# Patient Record
Sex: FEMALE | Race: WHITE | Employment: FULL TIME | URBAN - METROPOLITAN AREA
[De-identification: names, ages, dates, MRNs, and addresses within clinical notes are randomized per-mention and may not be internally consistent; named-entity substitution may affect disease eponyms.]

---

## 2018-11-05 LAB
ANTIBODY SCREEN, EXTERNAL: NEGATIVE
CHLAMYDIA, EXTERNAL: NEGATIVE
HBSAG, EXTERNAL: NEGATIVE
HIV, EXTERNAL: NORMAL
N. GONORRHEA, EXTERNAL: NEGATIVE
RUBELLA, EXTERNAL: NORMAL
T. PALLIDUM, EXTERNAL: NEGATIVE
TYPE, ABO & RH, EXTERNAL: NORMAL

## 2019-05-22 LAB — GRBS, EXTERNAL: NEGATIVE

## 2019-06-03 ENCOUNTER — HOSPITAL ENCOUNTER (EMERGENCY)
Age: 34
Discharge: HOME | End: 2019-06-03
Attending: OBSTETRICS & GYNECOLOGY | Admitting: OBSTETRICS & GYNECOLOGY
Payer: COMMERCIAL

## 2019-06-03 VITALS
HEART RATE: 115 BPM | DIASTOLIC BLOOD PRESSURE: 81 MMHG | WEIGHT: 199 LBS | OXYGEN SATURATION: 96 % | HEIGHT: 67 IN | SYSTOLIC BLOOD PRESSURE: 118 MMHG | BODY MASS INDEX: 31.23 KG/M2 | TEMPERATURE: 97.5 F | RESPIRATION RATE: 16 BRPM

## 2019-06-03 LAB
ALBUMIN SERPL-MCNC: 2.6 G/DL (ref 3.5–5)
ALBUMIN/GLOB SERPL: 0.8 {RATIO} (ref 1.1–2.2)
ALP SERPL-CCNC: 275 U/L (ref 45–117)
ALT SERPL-CCNC: 17 U/L (ref 12–78)
ANION GAP SERPL CALC-SCNC: 11 MMOL/L (ref 5–15)
AST SERPL-CCNC: 14 U/L (ref 15–37)
BILIRUB SERPL-MCNC: 0.4 MG/DL (ref 0.2–1)
BUN SERPL-MCNC: 9 MG/DL (ref 6–20)
BUN/CREAT SERPL: 14 (ref 12–20)
CALCIUM SERPL-MCNC: 8.5 MG/DL (ref 8.5–10.1)
CHLORIDE SERPL-SCNC: 108 MMOL/L (ref 97–108)
CO2 SERPL-SCNC: 20 MMOL/L (ref 21–32)
CREAT SERPL-MCNC: 0.65 MG/DL (ref 0.55–1.02)
CREAT UR-MCNC: 215 MG/DL
ERYTHROCYTE [DISTWIDTH] IN BLOOD BY AUTOMATED COUNT: 18.6 % (ref 11.5–14.5)
GLOBULIN SER CALC-MCNC: 3.2 G/DL (ref 2–4)
GLUCOSE SERPL-MCNC: 105 MG/DL (ref 65–100)
HCT VFR BLD AUTO: 39.5 % (ref 35–47)
HGB BLD-MCNC: 12.1 G/DL (ref 11.5–16)
MCH RBC QN AUTO: 25.4 PG (ref 26–34)
MCHC RBC AUTO-ENTMCNC: 30.6 G/DL (ref 30–36.5)
MCV RBC AUTO: 83 FL (ref 80–99)
NRBC # BLD: 0 K/UL (ref 0–0.01)
NRBC BLD-RTO: 0 PER 100 WBC
PLATELET # BLD AUTO: 145 K/UL (ref 150–400)
PMV BLD AUTO: 11.8 FL (ref 8.9–12.9)
POTASSIUM SERPL-SCNC: 4.1 MMOL/L (ref 3.5–5.1)
PROT SERPL-MCNC: 5.8 G/DL (ref 6.4–8.2)
PROT UR-MCNC: 37 MG/DL (ref 0–11.9)
PROT/CREAT UR-RTO: 0.2
RBC # BLD AUTO: 4.76 M/UL (ref 3.8–5.2)
SODIUM SERPL-SCNC: 139 MMOL/L (ref 136–145)
WBC # BLD AUTO: 9.1 K/UL (ref 3.6–11)

## 2019-06-03 PROCEDURE — 80053 COMPREHEN METABOLIC PANEL: CPT

## 2019-06-03 PROCEDURE — 36415 COLL VENOUS BLD VENIPUNCTURE: CPT

## 2019-06-03 PROCEDURE — 85027 COMPLETE CBC AUTOMATED: CPT

## 2019-06-03 PROCEDURE — 99285 EMERGENCY DEPT VISIT HI MDM: CPT

## 2019-06-03 PROCEDURE — 84156 ASSAY OF PROTEIN URINE: CPT

## 2019-06-03 RX ORDER — DULOXETIN HYDROCHLORIDE 20 MG/1
40 CAPSULE, DELAYED RELEASE ORAL
COMMUNITY

## 2019-06-03 RX ORDER — DESVENLAFAXINE SUCCINATE 50 MG/1
50 TABLET, EXTENDED RELEASE ORAL
COMMUNITY

## 2019-06-03 RX ORDER — LANOLIN ALCOHOL/MO/W.PET/CERES
CREAM (GRAM) TOPICAL DAILY
COMMUNITY
End: 2019-06-16

## 2019-06-03 NOTE — H&P
Labor and Delivery Triage Note    CC: Sent in for serial BPs and labs due to protein in urine    6/3/2019    29 y.o., , female, G4 P 3 with Estimated Date of Delivery: 19 by dates and US presents at 1510 who presents to L&D as directed by her physician due to elevated UPC of 0.42. BPs have been normal but she has been having some blurred vision and swelling. Reports good fetal movement, no bleeding, no LOF and has no regular contractions. She has no HAs, no RUQ pain except as related to fetal position, no CP/SOB. Her swelling is improved today. Her pregnancy is also notable for anxiety. This pregnancy has been supervised by Dr. Nohemy Martinez. PNC: Blood type: A            RH: pos            Ab screen: negative            HBsAg: negative            DM Screen: 153; 3hr 84, 177, 111, 110            TPA: negative            HIV: non reactive            GC/Chlamydia: negative            Rubella: immune            SVII serology: no history             GBS status: negative    Past Medical History:   Diagnosis Date    Abnormal Papanicolaou smear of cervix     follow up normal    Anemia     Breast disorder     augmentation     Psychiatric problem     anxiety     Past Surgical History:   Procedure Laterality Date    BREAST SURGERY PROCEDURE UNLISTED      HX OTHER SURGICAL      wisdom teeth     OB/GYN: G1 10/06  of VMI 7#5oz       G2   of VMI 7#             G3   of VFI 6#5oz       G4 current  Meds:   No current facility-administered medications for this encounter. Allergies:  Allergies no known allergies  Pertinent ROS: as per HPI o/w negative   Family History   Problem Relation Age of Onset    Thyroid Disease Mother     Parkinson's Disease Father     Alzheimer Maternal Grandfather      Social History     Socioeconomic History    Marital status:      Spouse name: Not on file    Number of children: Not on file    Years of education: Not on file    Highest education level: Not on file   Occupational History    Not on file   Social Needs    Financial resource strain: Not on file    Food insecurity:     Worry: Not on file     Inability: Not on file    Transportation needs:     Medical: Not on file     Non-medical: Not on file   Tobacco Use    Smoking status: Never Smoker    Smokeless tobacco: Never Used   Substance and Sexual Activity    Alcohol use: Not Currently    Drug use: Never    Sexual activity: Yes   Lifestyle    Physical activity:     Days per week: Not on file     Minutes per session: Not on file    Stress: Not on file   Relationships    Social connections:     Talks on phone: Not on file     Gets together: Not on file     Attends Sikhism service: Not on file     Active member of club or organization: Not on file     Attends meetings of clubs or organizations: Not on file     Relationship status: Not on file    Intimate partner violence:     Fear of current or ex partner: Not on file     Emotionally abused: Not on file     Physically abused: Not on file     Forced sexual activity: Not on file   Other Topics Concern     Service Not Asked    Blood Transfusions Not Asked    Caffeine Concern Not Asked    Occupational Exposure Not Asked    Hobby Hazards Not Asked    Sleep Concern Not Asked    Stress Concern Not Asked    Weight Concern Not Asked    Special Diet Not Asked    Back Care Not Asked    Exercise Not Asked    Bike Helmet Not Asked    [address] Not Asked    Self-Exams Not Asked   Social History Narrative    Not on file       OBJECTIVE:  Gravid female NAD  Temp (24hrs), Av.5 °F (36.4 °C), Min:97.5 °F (36.4 °C), Max:97.5 °F (36.4 °C)    Visit Vitals  /81   Pulse (!) 115   Temp 97.5 °F (36.4 °C)   Resp 16   Ht 5' 7\" (1.702 m)   Wt 90.3 kg (199 lb)   SpO2 96%   Breastfeeding?  No   BMI 31.17 kg/m²       Labs:    Lab Results   Component Value Date/Time    WBC 9.1 2019 03:27 PM       Exam:  HEENT:  normal   Lungs: clear  Cor:  RRR  Abdomen:  Soft, gravid, no RUQ tenderness with palpation  Fetal heart rate tracing:  Baseline 130, moderate variability, accels present, decels absent  Contraction pattern: occ  Cervix:  deferred  Fluid:  intact  Ext: symmetric, nontender, no edema  Neuro: 2+ patellar and brachial DTRs    Impression:  IUP at 37 5/7 weeks with elevated UPC in the office; normal BPs and labs here on L&D.     Plan: Discharge home           Follow up in office as scheduled     Irene Berrios MD

## 2019-06-03 NOTE — PROGRESS NOTES
6/3/2019  3:10 PM Pt arrived from home for preeclampsia work up. Pt reports blurred vision for the last 2 weeks as well as swelling of the feet by the end of the day. Denies headache at this time. Reports right upper quadrant discomfort that she attributes to fetal feet, denies tenderness on palpation. Labs drawn and sent.      [place] [address] Dr Ruby Powell at bedside, viewed fetal tracing, discussed plan of care    1630 Discussed lab results with Dr. Ruby Powell, pt to be discharged home with follow up this wee    1640 Discharge instructions given, pt verbalizes understanding, pt has scheduled appointment with Dr. Moriah Jang tomorrow

## 2019-06-03 NOTE — DISCHARGE INSTRUCTIONS
HYPERTENSIVE DISORDERS OF PREGNANCY DISCHARGE INSTRUCTIONS    Name: Vito James  YOB: 1985  Primary Diagnosis: Active Problems:    * No active hospital problems. *      Introduction:  You have visited the hospital because your physician thinks your blood pressure is increased or increasing. This condition during pregnancy is called gestational hypertension. If you also have an elevated level of protein in your urine, this condition is called preeclampsia. Some of the characteristics you may have seen or felt include edema (usually swelling of face, feet, ankles or hands), and increased weight gain, headaches, spots (floaters) before your eyes or excessive epigastric pain (much more intense than indigestion). It is because of these symptoms and the effect on you and your baby/babies that you have come to the hospital. These guidelines are for your information at home to help you decide whether to call your physician an possibly the need for hospitalization. General:         Diet/Diet Restrictions:      Anything you like/tolerate, Follow your regularly prescribed diet and Drink 8-10 glasses of water each day. Avoid beverages with caffeine. Physical Activity / Restrictions / Safety:     * Activity at home is based on how you may be feeling or at the direction of your physician. Activity based on symptoms: Lie down on your side if you feel contractions or tightening in your abdomen. Discharge Instructions/ Special Treatment/ Home Care Needs:     Call your provider if:   You notice an increase in swelling or excessive epigastric pain.  You have a constant headache not relieved by Tylenol.  You have dizziness while carrying out activities of daily living.  You see spots (floaters) before your eyes.  You start having regular painful contractions every 5 minutes or less for 1 hour. Time your contractions from the beginning of one to the beginning of the next one.      Your baby is not moving as much as usual-at least 4 fetal movements in 1 hour after drinking and resting on your side for 1 hour.   You have a gush of fluid or blood from your vagina (it is normal to have spotting after vaginal exam or intercourse).  Other:       Labor instructions:    Uterine cramping (menstrual-like cramps, intermittent or constant   Uterine contractions every 10-15 minutes or more frequently   Low abdominal pressure (pelvic pressure)   Dull low backache (intermittent or constant)   Increase or change in vaginal discharge   Feeling that the baby is \"pushing down\"   Abdominal cramping with or without diarrhea  If any of these symptoms are experienced, stop what you are doing, lie down on your side, drink two to three glasses of water and wait one hour. If the symptoms persist or get worse, call your provider. Pain Management:             Discharge Checklist-NURSING TO COMPLETE:     Date and Time of Discharge: Date: 6/3/2019 Time: 4:25 PM    Return of:   Dental Appliance: Dental Appliances: None  Vision: Visual Aid: None  Hearing Aid:    Jewelry: Jewelry: None  Clothing: Clothing: Footwear, Pants, Undergarments, With patient  Other Valuables: Other Valuables: Johnny Green, Cell Phone  Valuables sent to safe: Personal Items Sent to Safe: none    Prescription Given: no  Medication Instruction Sheet(s), including side effects, provided: no    Accompanied By: self    Mode of Transportation:    Discharge Disposition: Home    I have had the opportunity to make my options or choices for discharge. I have received and understand these instructions. These are general instructions for a healthy lifestyle:    No smoking/ No tobacco products/ Avoid exposure to second hand smoke    Surgeon General's Warning:  Quitting smoking now greatly reduces serious risk to your health.     Obesity, smoking, and sedentary lifestyle greatly increases your risk for illness    A healthy diet, regular physical exercise & weight monitoring are important for maintaining a healthy lifestyle    Recognize signs and symptoms of STROKE:    F-face looks uneven    A-arms unable to move or move unevenly    S-speech slurred or non-existent    T-time-call 911 as soon as signs and symptoms begin-DO NOT go       Back to bed or wait to see if you get better-TIME IS BRAIN.

## 2019-06-14 ENCOUNTER — ANESTHESIA (OUTPATIENT)
Dept: LABOR AND DELIVERY | Age: 34
End: 2019-06-14
Payer: COMMERCIAL

## 2019-06-14 ENCOUNTER — HOSPITAL ENCOUNTER (INPATIENT)
Age: 34
LOS: 2 days | Discharge: HOME | End: 2019-06-16
Attending: OBSTETRICS & GYNECOLOGY | Admitting: OBSTETRICS & GYNECOLOGY
Payer: COMMERCIAL

## 2019-06-14 ENCOUNTER — ANESTHESIA EVENT (OUTPATIENT)
Dept: LABOR AND DELIVERY | Age: 34
End: 2019-06-14
Payer: COMMERCIAL

## 2019-06-14 PROBLEM — F41.9 ANXIETY: Status: ACTIVE | Noted: 2019-06-14

## 2019-06-14 PROBLEM — Z37.9 NORMAL LABOR: Status: ACTIVE | Noted: 2019-06-14

## 2019-06-14 LAB
BASOPHILS # BLD: 0 K/UL (ref 0–0.1)
BASOPHILS NFR BLD: 0 % (ref 0–1)
DIFFERENTIAL METHOD BLD: ABNORMAL
EOSINOPHIL # BLD: 0 K/UL (ref 0–0.4)
EOSINOPHIL NFR BLD: 0 % (ref 0–7)
ERYTHROCYTE [DISTWIDTH] IN BLOOD BY AUTOMATED COUNT: 18.2 % (ref 11.5–14.5)
HCT VFR BLD AUTO: 39.3 % (ref 35–47)
HGB BLD-MCNC: 12.4 G/DL (ref 11.5–16)
IMM GRANULOCYTES # BLD AUTO: 0 K/UL (ref 0–0.04)
IMM GRANULOCYTES NFR BLD AUTO: 0 % (ref 0–0.5)
LYMPHOCYTES # BLD: 1.1 K/UL (ref 0.8–3.5)
LYMPHOCYTES NFR BLD: 12 % (ref 12–49)
MCH RBC QN AUTO: 25.8 PG (ref 26–34)
MCHC RBC AUTO-ENTMCNC: 31.6 G/DL (ref 30–36.5)
MCV RBC AUTO: 81.7 FL (ref 80–99)
MONOCYTES # BLD: 0.6 K/UL (ref 0–1)
MONOCYTES NFR BLD: 7 % (ref 5–13)
NEUTS SEG # BLD: 6.9 K/UL (ref 1.8–8)
NEUTS SEG NFR BLD: 81 % (ref 32–75)
NRBC # BLD: 0 K/UL (ref 0–0.01)
NRBC BLD-RTO: 0 PER 100 WBC
PLATELET # BLD AUTO: 149 K/UL (ref 150–400)
PMV BLD AUTO: 12.5 FL (ref 8.9–12.9)
RBC # BLD AUTO: 4.81 M/UL (ref 3.8–5.2)
WBC # BLD AUTO: 8.6 K/UL (ref 3.6–11)

## 2019-06-14 PROCEDURE — 77030014125 HC TY EPDRL BBMI -B: Performed by: ANESTHESIOLOGY

## 2019-06-14 PROCEDURE — 36415 COLL VENOUS BLD VENIPUNCTURE: CPT

## 2019-06-14 PROCEDURE — 74011000250 HC RX REV CODE- 250: Performed by: ANESTHESIOLOGY

## 2019-06-14 PROCEDURE — 75410000003 HC RECOV DEL/VAG/CSECN EA 0.5 HR

## 2019-06-14 PROCEDURE — 65410000002 HC RM PRIVATE OB

## 2019-06-14 PROCEDURE — 76060000078 HC EPIDURAL ANESTHESIA

## 2019-06-14 PROCEDURE — 74011250636 HC RX REV CODE- 250/636: Performed by: OBSTETRICS & GYNECOLOGY

## 2019-06-14 PROCEDURE — 74011250636 HC RX REV CODE- 250/636: Performed by: ANESTHESIOLOGY

## 2019-06-14 PROCEDURE — 3E0R3BZ INTRODUCTION OF ANESTHETIC AGENT INTO SPINAL CANAL, PERCUTANEOUS APPROACH: ICD-10-PCS | Performed by: ANESTHESIOLOGY

## 2019-06-14 PROCEDURE — 74011250636 HC RX REV CODE- 250/636

## 2019-06-14 PROCEDURE — A4300 CATH IMPL VASC ACCESS PORTAL: HCPCS

## 2019-06-14 PROCEDURE — 75410000002 HC LABOR FEE PER 1 HR

## 2019-06-14 PROCEDURE — 85025 COMPLETE CBC W/AUTO DIFF WBC: CPT

## 2019-06-14 PROCEDURE — 74011000250 HC RX REV CODE- 250

## 2019-06-14 PROCEDURE — 75410000000 HC DELIVERY VAGINAL/SINGLE

## 2019-06-14 PROCEDURE — 74011250637 HC RX REV CODE- 250/637: Performed by: OBSTETRICS & GYNECOLOGY

## 2019-06-14 RX ORDER — SODIUM CHLORIDE, SODIUM LACTATE, POTASSIUM CHLORIDE, CALCIUM CHLORIDE 600; 310; 30; 20 MG/100ML; MG/100ML; MG/100ML; MG/100ML
125 INJECTION, SOLUTION INTRAVENOUS CONTINUOUS
Status: DISCONTINUED | OUTPATIENT
Start: 2019-06-14 | End: 2019-06-16 | Stop reason: HOSPADM

## 2019-06-14 RX ORDER — ONDANSETRON 4 MG/1
4 TABLET, ORALLY DISINTEGRATING ORAL
Status: ACTIVE | OUTPATIENT
Start: 2019-06-14 | End: 2019-06-15

## 2019-06-14 RX ORDER — SIMETHICONE 80 MG
80 TABLET,CHEWABLE ORAL
Status: DISCONTINUED | OUTPATIENT
Start: 2019-06-14 | End: 2019-06-16 | Stop reason: HOSPADM

## 2019-06-14 RX ORDER — OXYTOCIN/0.9 % SODIUM CHLORIDE 30/500 ML
0-25 PLASTIC BAG, INJECTION (ML) INTRAVENOUS
Status: DISCONTINUED | OUTPATIENT
Start: 2019-06-14 | End: 2019-06-16 | Stop reason: HOSPADM

## 2019-06-14 RX ORDER — OXYTOCIN/0.9 % SODIUM CHLORIDE 30/500 ML
PLASTIC BAG, INJECTION (ML) INTRAVENOUS
Status: COMPLETED
Start: 2019-06-14 | End: 2019-06-14

## 2019-06-14 RX ORDER — BUPIVACAINE HYDROCHLORIDE 5 MG/ML
INJECTION, SOLUTION EPIDURAL; INTRACAUDAL AS NEEDED
Status: DISCONTINUED | OUTPATIENT
Start: 2019-06-14 | End: 2019-06-14 | Stop reason: HOSPADM

## 2019-06-14 RX ORDER — OXYTOCIN/RINGER'S LACTATE 20/1000 ML
125-500 PLASTIC BAG, INJECTION (ML) INTRAVENOUS ONCE
Status: COMPLETED | OUTPATIENT
Start: 2019-06-14 | End: 2019-06-14

## 2019-06-14 RX ORDER — ACETAMINOPHEN 325 MG/1
650 TABLET ORAL
Status: DISCONTINUED | OUTPATIENT
Start: 2019-06-14 | End: 2019-06-16 | Stop reason: HOSPADM

## 2019-06-14 RX ORDER — NALOXONE HYDROCHLORIDE 0.4 MG/ML
0.4 INJECTION, SOLUTION INTRAMUSCULAR; INTRAVENOUS; SUBCUTANEOUS AS NEEDED
Status: DISCONTINUED | OUTPATIENT
Start: 2019-06-14 | End: 2019-06-14 | Stop reason: HOSPADM

## 2019-06-14 RX ORDER — ONDANSETRON 2 MG/ML
4 INJECTION INTRAMUSCULAR; INTRAVENOUS
Status: DISCONTINUED | OUTPATIENT
Start: 2019-06-14 | End: 2019-06-14 | Stop reason: HOSPADM

## 2019-06-14 RX ORDER — FENTANYL CITRATE 50 UG/ML
100 INJECTION, SOLUTION INTRAMUSCULAR; INTRAVENOUS ONCE
Status: COMPLETED | OUTPATIENT
Start: 2019-06-14 | End: 2019-06-14

## 2019-06-14 RX ORDER — DIPHENHYDRAMINE HCL 25 MG
25 CAPSULE ORAL
Status: DISCONTINUED | OUTPATIENT
Start: 2019-06-14 | End: 2019-06-16 | Stop reason: HOSPADM

## 2019-06-14 RX ORDER — BUPIVACAINE HYDROCHLORIDE 5 MG/ML
30 INJECTION, SOLUTION EPIDURAL; INTRACAUDAL AS NEEDED
Status: DISCONTINUED | OUTPATIENT
Start: 2019-06-14 | End: 2019-06-14 | Stop reason: HOSPADM

## 2019-06-14 RX ORDER — IBUPROFEN 400 MG/1
800 TABLET ORAL EVERY 8 HOURS
Status: DISCONTINUED | OUTPATIENT
Start: 2019-06-14 | End: 2019-06-16 | Stop reason: HOSPADM

## 2019-06-14 RX ORDER — NALOXONE HYDROCHLORIDE 0.4 MG/ML
0.4 INJECTION, SOLUTION INTRAMUSCULAR; INTRAVENOUS; SUBCUTANEOUS AS NEEDED
Status: DISCONTINUED | OUTPATIENT
Start: 2019-06-14 | End: 2019-06-16 | Stop reason: HOSPADM

## 2019-06-14 RX ORDER — MAG HYDROX/ALUMINUM HYD/SIMETH 200-200-20
30 SUSPENSION, ORAL (FINAL DOSE FORM) ORAL
Status: DISCONTINUED | OUTPATIENT
Start: 2019-06-14 | End: 2019-06-14 | Stop reason: HOSPADM

## 2019-06-14 RX ORDER — CALCIUM CARBONATE 200(500)MG
400 TABLET,CHEWABLE ORAL
Status: DISCONTINUED | OUTPATIENT
Start: 2019-06-14 | End: 2019-06-14 | Stop reason: HOSPADM

## 2019-06-14 RX ORDER — DOCUSATE SODIUM 100 MG/1
100 CAPSULE, LIQUID FILLED ORAL
Status: DISCONTINUED | OUTPATIENT
Start: 2019-06-14 | End: 2019-06-16 | Stop reason: HOSPADM

## 2019-06-14 RX ORDER — NALBUPHINE HYDROCHLORIDE 10 MG/ML
10 INJECTION, SOLUTION INTRAMUSCULAR; INTRAVENOUS; SUBCUTANEOUS
Status: DISCONTINUED | OUTPATIENT
Start: 2019-06-14 | End: 2019-06-14 | Stop reason: HOSPADM

## 2019-06-14 RX ORDER — FENTANYL CITRATE 50 UG/ML
INJECTION, SOLUTION INTRAMUSCULAR; INTRAVENOUS AS NEEDED
Status: DISCONTINUED | OUTPATIENT
Start: 2019-06-14 | End: 2019-06-14 | Stop reason: HOSPADM

## 2019-06-14 RX ORDER — SODIUM CHLORIDE 0.9 % (FLUSH) 0.9 %
5-40 SYRINGE (ML) INJECTION AS NEEDED
Status: DISCONTINUED | OUTPATIENT
Start: 2019-06-14 | End: 2019-06-16 | Stop reason: HOSPADM

## 2019-06-14 RX ORDER — SWAB
1 SWAB, NON-MEDICATED MISCELLANEOUS DAILY
Status: DISCONTINUED | OUTPATIENT
Start: 2019-06-15 | End: 2019-06-16 | Stop reason: HOSPADM

## 2019-06-14 RX ORDER — OXYCODONE AND ACETAMINOPHEN 5; 325 MG/1; MG/1
1 TABLET ORAL
Status: DISCONTINUED | OUTPATIENT
Start: 2019-06-14 | End: 2019-06-16 | Stop reason: HOSPADM

## 2019-06-14 RX ORDER — FENTANYL/BUPIVACAINE/NS/PF 2-1250MCG
1-16 PREFILLED PUMP RESERVOIR EPIDURAL CONTINUOUS
Status: DISCONTINUED | OUTPATIENT
Start: 2019-06-14 | End: 2019-06-16 | Stop reason: HOSPADM

## 2019-06-14 RX ORDER — LIDOCAINE HYDROCHLORIDE AND EPINEPHRINE 15; 5 MG/ML; UG/ML
INJECTION, SOLUTION EPIDURAL AS NEEDED
Status: DISCONTINUED | OUTPATIENT
Start: 2019-06-14 | End: 2019-06-14 | Stop reason: HOSPADM

## 2019-06-14 RX ORDER — SODIUM CHLORIDE 0.9 % (FLUSH) 0.9 %
5-40 SYRINGE (ML) INJECTION EVERY 8 HOURS
Status: DISCONTINUED | OUTPATIENT
Start: 2019-06-14 | End: 2019-06-16 | Stop reason: HOSPADM

## 2019-06-14 RX ORDER — HYDROCORTISONE ACETATE PRAMOXINE HCL 2.5; 1 G/100G; G/100G
CREAM TOPICAL AS NEEDED
Status: DISCONTINUED | OUTPATIENT
Start: 2019-06-14 | End: 2019-06-16 | Stop reason: HOSPADM

## 2019-06-14 RX ORDER — DULOXETIN HYDROCHLORIDE 20 MG/1
40 CAPSULE, DELAYED RELEASE ORAL DAILY
Status: DISCONTINUED | OUTPATIENT
Start: 2019-06-15 | End: 2019-06-16 | Stop reason: HOSPADM

## 2019-06-14 RX ADMIN — BUPIVACAINE HYDROCHLORIDE 1 ML: 5 INJECTION, SOLUTION EPIDURAL; INTRACAUDAL at 19:42

## 2019-06-14 RX ADMIN — LIDOCAINE HYDROCHLORIDE AND EPINEPHRINE 2 ML: 15; 5 INJECTION, SOLUTION EPIDURAL at 19:39

## 2019-06-14 RX ADMIN — Medication 10 ML/HR: at 19:41

## 2019-06-14 RX ADMIN — OXYTOCIN-SODIUM CHLORIDE 0.9% IV SOLN 30 UNIT/500ML 999 MILLI-UNITS/MIN: 30-0.9/5 SOLUTION at 21:06

## 2019-06-14 RX ADMIN — Medication 999 MILLI-UNITS/MIN: at 21:06

## 2019-06-14 RX ADMIN — Medication 6660 MILLI-UNITS/HR: at 22:19

## 2019-06-14 RX ADMIN — FENTANYL CITRATE 50 MCG: 50 INJECTION, SOLUTION INTRAMUSCULAR; INTRAVENOUS at 19:44

## 2019-06-14 RX ADMIN — FENTANYL CITRATE 100 MCG: 50 INJECTION, SOLUTION INTRAMUSCULAR; INTRAVENOUS at 19:36

## 2019-06-14 RX ADMIN — LIDOCAINE HYDROCHLORIDE AND EPINEPHRINE 2 ML: 15; 5 INJECTION, SOLUTION EPIDURAL at 19:40

## 2019-06-14 RX ADMIN — FENTANYL CITRATE 50 MCG: 50 INJECTION, SOLUTION INTRAMUSCULAR; INTRAVENOUS at 19:43

## 2019-06-14 RX ADMIN — IBUPROFEN 800 MG: 400 TABLET, FILM COATED ORAL at 22:44

## 2019-06-14 RX ADMIN — BUPIVACAINE HYDROCHLORIDE 2 ML: 5 INJECTION, SOLUTION EPIDURAL; INTRACAUDAL at 19:41

## 2019-06-14 NOTE — ANESTHESIA PROCEDURE NOTES
Epidural Block    Start time: 6/14/2019 7:31 PM  End time: 6/14/2019 7:44 PM  Performed by: Leeroy Son MD  Authorized by: Leeroy Son MD     Pre-Procedure  Indication: labor epidural    Preanesthetic Checklist: risks and benefits discussed and timeout performed    Timeout Time: 19:31        Epidural:   Patient position:  Left lateral decubitus  Prep region:  Lumbar  Prep: Chlorhexidine    Location:  L3-4    Needle and Epidural Catheter:   Needle Type:  Tuohy  Needle Gauge:  17 G  Injection Technique:  Loss of resistance using air  Attempts:  1  Catheter Size:  19 G  Events: no blood with aspiration, no cerebrospinal fluid with aspiration, no paresthesia and negative aspiration test    Test Dose:  Bupivacaine 0.25% and negative    Assessment:   Catheter Secured:  Tegaderm and tape  Insertion:  Uncomplicated  Patient tolerance:  Patient tolerated the procedure well with no immediate complications

## 2019-06-14 NOTE — PROGRESS NOTES
:  Bedside shift change report given to GAVINO Sifuentes RN (oncoming nurse) by MIGUELINA Mitchell RN (offgoing nurse). Report included the following information SBAR, MAR, Accordion and Recent Results. :  Pt feeling urge to push, SVE 10/10/+2    : Dr. Jillian Griggs at bedside. :  of vigorous female infant. :  TRANSFER - OUT REPORT:    Verbal report given to KALIN Catalan(name) on Alanna Murrieta  being transferred to MIU(unit) for routine progression of care       Report consisted of patients Situation, Background, Assessment and   Recommendations(SBAR). Information from the following report(s) SBAR, Intake/Output, MAR, Accordion and Recent Results was reviewed with the receiving nurse. Lines:   Peripheral IV 19 Anterior; Left Forearm (Active)   Site Assessment Clean, dry, & intact 2019  7:58 PM   Phlebitis Assessment 0 2019  7:58 PM   Infiltration Assessment 0 2019  7:58 PM   Dressing Status Clean, dry, & intact 2019  7:58 PM   Dressing Type Tape;Transparent 2019  7:58 PM   Hub Color/Line Status Pink; Infusing 2019  7:58 PM        Opportunity for questions and clarification was provided.       Patient transported with:   Registered Nurse

## 2019-06-14 NOTE — PROGRESS NOTES
1825-pt here from home, episode of bright red blood at home, now feeling UCs, no active bleeding  1839-spoke with Dr Jeovany Solis her on pt arrival and status  1900-Dr Vasquez here to see pt  1907-off monitor to move around, into knee chest  1918-up to void  1937-Epidural placed by Dr Alonso Carver, bedside report to Torres Hoang RN

## 2019-06-14 NOTE — H&P
History & Physical    Name: Melia Grant MRN: [id number]  SSN: [id number]    YOB: 1985  Age: 29 y.o. Sex: female        Subjective:     Estimated Date of Delivery: 19  OB History    Para Term  AB Living   4 3 3     3   SAB TAB Ectopic Molar Multiple Live Births                    # Outcome Date GA Lbr Manuel/2nd Weight Sex Delivery Anes PTL Lv   4 Current            3 Term            2 Term            1 Term                Ms. Vito Arce is a very pleasant  29 y.o. [id number] at 44w2d who presents with c/o vaginal bleeding. It started off as a little bit of spotting and then was more period like and actually ran down her legs. Her ctxs have since intensified. Prenatal course was normal. Please see prenatal records for details. She does have a history of anxiety. She's had 3 term vaginal deliveries. The last one was OP so it took her a bit longer to push out. She's here with her  Rowdy Le.    Past Medical History:   Diagnosis Date    Abnormal Papanicolaou smear of cervix     follow up normal    Anemia     Breast disorder     augmentation 2004    Psychiatric problem     anxiety     Past Surgical History:   Procedure Laterality Date    BREAST SURGERY PROCEDURE UNLISTED      HX OTHER SURGICAL      wisdom teeth     Social History     Occupational History    Not on file   Tobacco Use    Smoking status: Never Smoker    Smokeless tobacco: Never Used   Substance and Sexual Activity    Alcohol use: Not Currently    Drug use: Never    Sexual activity: Yes     Family History   Problem Relation Age of Onset    Thyroid Disease Mother     Parkinson's Disease Father     Alzheimer Maternal Grandfather        No Known Allergies  Prior to Admission medications    Medication Sig Start Date End Date Taking? Authorizing Provider   desvenlafaxine succinate (PRISTIQ) 50 mg ER tablet Take 50 mg by mouth daily.    Yes Provider, Historical   DULoxetine (CYMBALTA) 20 mg capsule Take 40 mg by mouth daily. Yes Provider, Historical   [id number] deven-folic-dha (PRENATAL DHA+COMPLETE PRENATAL) [id number] mg-mcg-mg cmpk Take  by mouth. Yes Provider, Historical   ferrous sulfate (IRON) 325 mg (65 mg iron) tablet Take  by mouth daily. Yes Provider, Historical        Review of Systems: A comprehensive review of systems was negative except for that written in the HPI. Objective:     Vitals:  Vitals:    06/14/19 1830   BP: 127/87   Pulse: 95   Resp: 16   Temp: 99 °F (37.2 °C)        Physical Exam:  Fundus: soft and non tender  Cervical Exam: 3/80/-2  Presenting Part: cephalic  Cervical Position: anterior  (Cervical exam per nurse)  Membranes:  Intact  Fetal Heart Rate: Reactive  Baseline: 130 per minute  Variability: moderate  Accelerations: yes  Decelerations: none  Category I    Prenatal Labs:   Lab Results   Component Value Date/Time    Rubella, External Immune 11/05/2018    GrBStrep, External Negative 05/22/2019    HBsAg, External Negative 11/05/2018    Gonorrhea, External Negative 11/05/2018    Chlamydia, External Negative 11/05/2018    ABO,Rh A Positive 11/05/2018         Assessment/Plan:     Active Problems:    Normal labor (6/14/2019)      Anxiety (6/14/2019)         Plan: Admit for Labor  Continue expectant management, Continue plan for vaginal delivery, I reviewed her written birth wishes with her. She plans epidural for pain management. .  Group B Strep was negative.

## 2019-06-15 PROCEDURE — 65410000002 HC RM PRIVATE OB

## 2019-06-15 PROCEDURE — 74011250637 HC RX REV CODE- 250/637: Performed by: OBSTETRICS & GYNECOLOGY

## 2019-06-15 RX ORDER — IBUPROFEN 600 MG/1
600 TABLET ORAL
Qty: 40 TAB | Refills: 0 | Status: SHIPPED | OUTPATIENT
Start: 2019-06-15 | End: 2020-07-09

## 2019-06-15 RX ADMIN — IBUPROFEN 800 MG: 400 TABLET, FILM COATED ORAL at 06:06

## 2019-06-15 RX ADMIN — IBUPROFEN 800 MG: 400 TABLET, FILM COATED ORAL at 16:11

## 2019-06-15 NOTE — ROUTINE PROCESS
Bedside and Verbal shift change report given to 3030 W Dr Siria Dyson (oncoming nurse) by Naseem Calderon RN (offgoing nurse). Report included the following information SBAR.     0030: Mom stated she felt a large gush. Upon assessment ice pack filled with Lochia. Massaged fundus which was firm @u, quarter size clot expelled. Pt up to the bathroom. Massaged fundus after emptying bladder, firm midline @u  No additional bleeding noted.      0300: Educated mom about not falling asleep with baby in the bed

## 2019-06-15 NOTE — LACTATION NOTE
This note was copied from a baby's chart. Initial Lactation Consultation:  Mom is a 32yo  delivered  yesterday at 1851 gestation 44 2/7weeks. Medical history is significant breast augmentation and for anxiety (on Pristiq and Cymbalta); lactation history: breast fed first for 6 weeks, second for less than a week, and third only in the hospital. She had milk with each, but stopped due to nipple pain. Mom's goal is to exclusively breast feed this infant 6 months. She did have breast changes with pregnancy. Mom taught breast massage and hand expression with large drops of colostrum easily expressed. Infant has been alert and nursing since delivery, but arching back. Assisted mom to position infant sidelying. Baby nursing well, deep latch obtained, mother is comfortable, baby feeding vigorously with rhythmic suck, swallow, breathe pattern, intermittent audible swallowing, and evident milk transfer, both breasts offered, baby is asleep following feeding. Mom taught characteristics of deep v shallow latch. Otis behaviors reviewed, Very sleepy in first 24 hours, mother must wake baby for feedings, offer hand expressed drops, baby usually will respond and feed vigorously 6-8 times in the first day, but is important to offer 8-12 times,  After baby wakes from deep sleep usually on the 2nd or 3rd day a new behavior pattern follows. Frequent feeding during this brief behavioral phase preceeding milk transition is called cluster feeding. Typical  behavior: baby becomes vigorous at the breast and wants to feed frequently- every 1-2 hours for several feedings. This is the normal process by which the baby demands his/her supply. This type of frequent feeding is the stimulation which causes lactogenesis II (milk coming in). Feeding Plan:   Mother will keep baby skin to skin as often as possible, feed on demand, 8-12x/day , respond to feeding cues, obtain latch, listen for audible swallowing, be aware of signs of oxytocin release/ cramping,thirst,sleepiness while breastfeeding, offer both breasts,and will not limit feedings. Mother agrees to utilize breast massage while nursing to facilitate lactogenesis.

## 2019-06-15 NOTE — ANESTHESIA POSTPROCEDURE EVALUATION
Post-Anesthesia Evaluation and Assessment    Patient: Hannah Montenegro MRN: [id number]  SSN: [id number]    YOB: 1985  Age: 29 y.o. Sex: female      I have evaluated the patient and they are stable and ready for discharge from the PACU. Cardiovascular Function/Vital Signs  Visit Vitals  /56   Pulse (!) 108   Temp 37.2 °C (99 °F)   Resp 16   SpO2 98%       Patient is status post * No anesthesia type entered * anesthesia for * No procedures listed *. Nausea/Vomiting: None    Postoperative hydration reviewed and adequate. Pain:  Pain Scale 1: Labor Algorithm/Pain Intensity (06/14/19 1462)   Managed    Neurological Status: At baseline    Mental Status, Level of Consciousness: Alert and  oriented to person, place, and time    Pulmonary Status:       Adequate oxygenation and airway patent    Complications related to anesthesia: None    Post-anesthesia assessment completed. No concerns    Signed By: Rod Baeza MD     June 14, 2019              * No procedures listed *.    epidural    <BSHSIANPOST>    Vitals Value Taken Time   /66 6/14/2019  9:01 PM   Temp     Pulse 98 6/14/2019  9:01 PM   Resp     SpO2 96 % 6/14/2019  8:51 PM   Vitals shown include unvalidated device data.

## 2019-06-15 NOTE — L&D DELIVERY NOTE
Delivery Note    Obstetrician:  Ryan Lindsay MD      Procedure: Spontaneous vaginal delivery    Stephanie Diop quickly progressed to complete. She pushed for about 20 minutes or so. Baby girl (name TBD) delivered in [address] [place] [address]. Shoulders and body easily followed. Baby girl placed on mom's chest. Cord clamped and cut by grandma ( declined) after a couple minutes. Placenta delivered spontaneously and intact. No lacerations. Epidural: YES    Estimated Blood Loss: See QBL    Episiotomy: none    Laceration(s):  none    Birth Weight:pending    Birth Length: pending    Apgar - One Minute: 8    Apgar - Five Minutes: 9    Umbilical Cord: 3 vessels present  Specimens: none  Complications:  none           Cord Blood Results:   Information for the patient's :  Nenita Robb [[id number]]   No results found for: PCTABR, PCTDIG, BILI, ABORH    Prenatal Labs:     Lab Results   Component Value Date/Time    HBsAg, External Negative 2018    Rubella, External Immune 2018    Gonorrhea, External Negative 2018    Chlamydia, External Negative 2018    GrBStrep, External Negative 2019           Delivery Summary    Patient: Ariadne Medellin MRN: [id number]  SSN: [id number]    YOB: 1985  Age: 29 y.o. Sex: female       Information for the patient's :  Nenita Robb [[id number]]       Labor Events:    Labor: No    Steroids: None   Cervical Ripening Date/Time:       Cervical Ripening Type: None   Antibiotics During Labor: No   Rupture Identifier:      Rupture Date/Time: 2019 8:18 PM   Rupture Type: AROM   Amniotic Fluid Volume:  Moderate    Amniotic Fluid Description: Meconium    Amniotic Fluid Odor: None    Induction: None       Induction Date/Time:        Indications for Induction:      Augmentation:     Augmentation Date/Time:      Indications for Augmentation:     Labor complications: None       Additional complications:        Delivery Events:  Indications For Episiotomy:     Episiotomy: None   Perineal Laceration(s): None   Repaired:     Periurethral Laceration Location:      Repaired:     Labial Laceration Location:     Repaired:     Sulcal Laceration Location:     Repaired:     Vaginal Laceration Location:     Repaired:     Cervical Laceration Location:     Repaired:     Repair Suture: None   Number of Repair Packets:     Estimated Blood Loss (ml):  ml     Delivery Date: 2019    Delivery Time: 8:51 PM  Delivery Type: Vaginal, Spontaneous  Sex:  Female    Gestational Age: 44w2d   Delivery Clinician:  Nathanael Boas Rankins  Living Status: Living   Delivery Location: L&D            APGARS  One minute Five minutes Ten minutes   Skin color: 1   1        Heart rate: 2   2        Grimace: 2   2        Muscle tone: 2   2        Breathin   2        Totals: 8   9            Presentation: Vertex    Position:        Resuscitation Method:  Tactile Stimulation;Suctioning-bulb     Meconium Stained: Thin      Cord Information: 3 Vessels  Complications: None  Cord around:    Delayed cord clamping? Yes  Cord clamped date/time:   Disposition of Cord Blood: Discard    Blood Gases Sent?: No    Placenta:  Date/Time: 2019  8:55 PM  Removal: Spontaneous      Appearance: Normal;Intact     [place] Measurements:  Birth Weight:        Birth Length:        Head Circumference:        Chest Circumference:       Abdominal Girth: Other Providers:   Javy GONCALVES;SAVAGE TRINH;Etienne TREJO, Obstetrician;Primary Nurse;Primary  Nurse;;;;;;;           Group B Strep:   Lab Results   Component Value Date/Time    GrBStrep, External Negative 2019     Information for the patient's :  Catracho Petersenliborio [[id number]]   No results found for: ABORH, PCTABR, PCTDIG, BILI, ABORHEXT, ABORH    No results for input(s): PCO2CB, PO2CB, HCO3I, SO2I, IBD, PTEMPI, SPECTI, PHICB, ISITE, IDEV, IALLEN in the last 72 hours.

## 2019-06-15 NOTE — DISCHARGE INSTRUCTIONS
Patient Education        Vaginal Childbirth: Care Instructions  Your Care Instructions    Your body will slowly heal in the next few weeks. It is easy to get too tired and overwhelmed during the first weeks after your baby is born. Changes in your hormones can shift your mood without warning. You may find it hard to meet the extra demands on your energy and time. Take it easy on yourself. Follow-up care is a key part of your treatment and safety. Be sure to make and go to all appointments, and call your doctor if you are having problems. It's also a good idea to know your test results and keep a list of the medicines you take. How can you care for yourself at home? · Vaginal bleeding and cramps  ? After delivery, you will have a bloody discharge from the vagina. This will turn pink within a week and then white or yellow after about 10 days. It may last for 2 to 4 weeks or longer, until the uterus has healed. Use pads instead of tampons until you stop bleeding. ? Do not worry if you pass some blood clots, as long as they are smaller than a golf ball. If you have a tear or stitches in your vaginal area, change the pad at least every 4 hours to prevent soreness and infection. ? You may have cramps for the first few days after childbirth. These are normal and occur as the uterus shrinks to normal size. Take an over-the-counter pain medicine, such as acetaminophen (Tylenol), ibuprofen (Advil, Motrin), or naproxen (Aleve), for cramps. Read and follow all instructions on the label. Do not take aspirin, because it can cause more bleeding. ? Do not take two or more pain medicines at the same time unless the doctor told you to. Many pain medicines have acetaminophen, which is Tylenol. Too much acetaminophen (Tylenol) can be harmful. · Stitches  ? If you have stitches, they will dissolve on their own and do not need to be removed. Follow your doctor's instructions for cleaning the stitched area.   ? Put ice or a cold pack on your painful area for 10 to 20 minutes at a time, several times a day, for the first few days. Put a thin cloth between the ice and your skin. ? Sit in a few inches of warm water (sitz bath) 3 times a day and after bowel movements. The warm water helps with pain and itching. If you do not have a tub, a warm shower might help. · Breast fullness  ? Your breasts may overfill (engorge) in the first few days after delivery. To help milk flow and to relieve pain, warm your breasts in the shower or by using warm, moist towels before nursing. ? If you are not nursing, do not put warmth on your breasts or touch your breasts. Wear a tight bra or sports bra and use ice until the fullness goes away. This usually takes 2 to 3 days. ? Put ice or a cold pack on your breast after nursing to reduce swelling and pain. Put a thin cloth between the ice and your skin. · Activity  ? Eat a balanced diet. Do not try to lose weight by cutting calories. Keep taking your prenatal vitamins, or take a multivitamin. ? Get as much rest as you can. Try to take naps when your baby sleeps during the day. ? Get some exercise every day. But do not do any heavy exercise until your doctor says it is okay. ? Wait until you are healed (about 4 to 6 weeks) before you have sexual intercourse. Your doctor will tell you when it is okay to have sex. ? Talk to your doctor about birth control. You can get pregnant even before your period returns. Also, you can get pregnant while you are breastfeeding. · Mental health  ? It is normal to have some sadness, anxiety, sleeplessness, and mood swings after you go home. If you feel upset or hopeless for more than a few days or are having trouble doing the things you need to do, talk to your doctor. · Constipation and hemorrhoids  ? Drink plenty of fluids, enough so that your urine is light yellow or clear like water.  If you have kidney, heart, or liver disease and have to limit fluids, talk with your doctor before you increase the amount of fluids you drink. ? Eat plenty of fiber each day. Have a bran muffin or bran cereal for breakfast, and try eating a piece of fruit for a mid-afternoon snack. ? For painful, itchy hemorrhoids, put ice or a cold pack on the area several times a day for 10 minutes at a time. Follow this by putting a warm compress on the area for another 10 to 20 minutes or by sitting in a shallow, warm bath. When should you call for help? Call 911 anytime you think you may need emergency care. For example, call if:    · You passed out (lost consciousness).    Call your doctor now or seek immediate medical care if:    · You have severe vaginal bleeding.     · You are dizzy or lightheaded, or you feel like you may faint.     · You have a fever.     · You have new or more pain in your belly or pelvis.    Watch closely for changes in your health, and be sure to contact your doctor if:    · Your vaginal bleeding seems to be getting heavier.     · You have new or worse vaginal discharge.     · You feel sad, anxious, or hopeless for more than a few days.     · You do not get better as expected. Where can you learn more? Go to http://rich-laura.info/. Enter M068 in the search box to learn more about \"Vaginal Childbirth: Care Instructions. \"  Current as of: September 5, 2018  Content Version: 11.9  © 3831-2273 [employer]. Care instructions adapted under license by [employer] (which disclaims liability or warranty for this information). If you have questions about a medical condition or this instruction, always ask your healthcare professional. [place] [address] any warranty or liability for your use of this information.        Breastfeeding Support Group  [employer] [facility] meets the 1st and 3rd Tuesday of each month in the [address] [place] [address] [employer] Department from 10:00-11:00, (located behind [employer] on the first floor) Meetings are facilitated by board certified lactation consultants and all breastfeeding moms and their infants are invited.

## 2019-06-15 NOTE — ROUTINE PROCESS
[id number] TRANSFER - IN REPORT:    Verbal report received from Carl West RN(name) on Kellie Tang  being received from L&D(unit) for routine progression of care      Report consisted of patients Situation, Background, Assessment and   Recommendations(SBAR). Information from the following report(s) SBAR was reviewed with the receiving nurse. Opportunity for questions and clarification was provided. Assessment completed upon patients arrival to unit and care assumed.

## 2019-06-15 NOTE — DISCHARGE SUMMARY
Obstetrical Discharge Summary     Name: Gracie Moreno MRN: [id number]  SSN: [id number]    YOB: 1985  Age: 29 y.o. Sex: female      Admit Date: 2019    Discharge Date: 2019     Admitting Physician: Avril Tony MD     Attending Physician:  Lubna Whiting, Diana Allen,*     Admission Diagnoses: Normal labor [O80, Z37.9]  Normal labor [O80, Z37.9]    Discharge Diagnoses:   Information for the patient's :  Reva Valencia [[id number]]   Delivery of a 3.41 kg female infant via Vaginal, Spontaneous on 2019 at 8:51 PM  by Librado Henry. Apgars were 8  and 9 . Additional Diagnoses:   Hospital Problems  Date Reviewed: 2019          Codes Class Noted POA    Normal labor ICD-10-CM: O80, Z37.9  ICD-9-CM: 236  2019 Yes        Anxiety ICD-10-CM: F41.9  ICD-9-CM: 300.00  2019 Yes             Lab Results   Component Value Date/Time    Rubella, External Immune 2018    GrBStrep, External Negative 2019       Hospital Course: Normal hospital course following the delivery. Disposition at Discharge: Home or self care    Discharged Condition: Stable    Patient Instructions:   Current Discharge Medication List      START taking these medications    Details   ibuprofen (MOTRIN) 600 mg tablet Take 1 Tab by mouth every six (6) hours as needed for Pain. Qty: 40 Tab, Refills: 0         CONTINUE these medications which have NOT CHANGED    Details   desvenlafaxine succinate (PRISTIQ) 50 mg ER tablet Take 50 mg by mouth daily. DULoxetine (CYMBALTA) 20 mg capsule Take 40 mg by mouth daily. [id number] deven-folic-dha (PRENATAL DHA+COMPLETE PRENATAL) -300 mg-mcg-mg cmpk Take  by mouth. STOP taking these medications       ferrous sulfate (IRON) 325 mg (65 mg iron) tablet Comments:   Reason for Stopping:               Reference my discharge instructions.     Follow-up Appointments   Procedures    FOLLOW UP VISIT Appointment in: 6 Weeks Standing Status:   Standing     Number of Occurrences:   1     Order Specific Question:   Appointment in     Answer:   6 Weeks        Signed By:  Bob Scott MD     Uma 15, 2019

## 2019-06-15 NOTE — PROGRESS NOTES
Post-Partum Day Number 1 Progress Note    Meera Segallius     Assessment: Doing well, post partum day 1 s/p TSVD    Plan:  1. Continue routine postpartum and perineal care as well as maternal education. 2. Patient desires early discharge today - recommend she discuss with the pediatrician as her baby may not be eligible for discharge, but patient is stable and a candidate     Information for the patient's :  Chancey Gottron [[id number]]   Vaginal, Spontaneous     Subjective:  Patient doing well without significant complaint. Voiding without difficulty, normal lochia. Cramping only with breastfeeding. Wants to go home today. Vitals:  Visit Vitals  /76 (BP 1 Location: Right arm, BP Patient Position: At rest)   Pulse 83   Temp 97.8 °F (36.6 °C)   Resp 14   SpO2 96%   Breastfeeding? Unknown     Temp (24hrs), Av.4 °F (36.9 °C), Min:97.8 °F (36.6 °C), Max:99 °F (37.2 °C)        Exam:   Patient without distress. Abdomen soft, fundus firm, nontender                Perineum with normal lochia noted. Lower extremities are negative for swelling, cords or tenderness.     Labs:     Lab Results   Component Value Date/Time    WBC 8.6 2019 07:13 PM    WBC 9.1 2019 03:27 PM    HGB 12.4 2019 07:13 PM    HGB 12.1 2019 03:27 PM    HCT 39.3 2019 07:13 PM    HCT 39.5 2019 03:27 PM    PLATELET 036 (L)  07:13 PM    PLATELET 822 (L)  03:27 PM       Recent Results (from the past 24 hour(s))   CBC WITH AUTOMATED DIFF    Collection Time: 19  7:13 PM   Result Value Ref Range    WBC 8.6 3.6 - 11.0 K/uL    RBC 4.81 3.80 - 5.20 M/uL    HGB 12.4 11.5 - 16.0 g/dL    HCT 39.3 35.0 - 47.0 %    MCV 81.7 80.0 - 99.0 FL    MCH 25.8 (L) 26.0 - 34.0 PG    MCHC 31.6 30.0 - 36.5 g/dL    RDW 18.2 (H) 11.5 - 14.5 %    PLATELET 473 (L) 784 - 400 K/uL    MPV 12.5 8.9 - 12.9 FL    NRBC 0.0 0  WBC    ABSOLUTE NRBC 0.00 0.00 - 0.01 K/uL NEUTROPHILS 81 (H) 32 - 75 %    LYMPHOCYTES 12 12 - 49 %    MONOCYTES 7 5 - 13 %    EOSINOPHILS 0 0 - 7 %    BASOPHILS 0 0 - 1 %    IMMATURE GRANULOCYTES 0 0.0 - 0.5 %    ABS. NEUTROPHILS 6.9 1.8 - 8.0 K/UL    ABS. LYMPHOCYTES 1.1 0.8 - 3.5 K/UL    ABS. MONOCYTES 0.6 0.0 - 1.0 K/UL    ABS. EOSINOPHILS 0.0 0.0 - 0.4 K/UL    ABS. BASOPHILS 0.0 0.0 - 0.1 K/UL    ABS. IMM.  GRANS. 0.0 0.00 - 0.04 K/UL    DF AUTOMATED

## 2019-06-16 VITALS
DIASTOLIC BLOOD PRESSURE: 80 MMHG | RESPIRATION RATE: 16 BRPM | TEMPERATURE: 98 F | OXYGEN SATURATION: 96 % | SYSTOLIC BLOOD PRESSURE: 127 MMHG | HEART RATE: 100 BPM

## 2019-06-16 PROCEDURE — 74011250637 HC RX REV CODE- 250/637: Performed by: OBSTETRICS & GYNECOLOGY

## 2019-06-16 RX ADMIN — IBUPROFEN 800 MG: 400 TABLET, FILM COATED ORAL at 00:55

## 2019-06-16 NOTE — PROGRESS NOTES
Post-Partum Day Number 2 Progress Note    Meera Max     Assessment: Doing well, post partum day 2    Plan:   1. Discharge home today  2. Follow up in office in 6 weeks with [employer], [employer],*  3. Post partum activity advised, diet as tolerated  4. Discharge Medications: ibuprofen and PNV    Information for the patient's :  Rafa Devries [[id number]]   Vaginal, Spontaneous    Subjective:  Patient doing well without significant complaint. Voiding without difficulty, normal lochia. Vitals:  Visit Vitals  /84 (BP 1 Location: Left arm, BP Patient Position: At rest)   Pulse 85   Temp 97.8 °F (36.6 °C)   Resp 14   SpO2 96%   Breastfeeding? Unknown     Temp (24hrs), Av.1 °F (36.7 °C), Min:97.7 °F (36.5 °C), Max:98.8 °F (37.1 °C)      Exam:         Patient without distress. Abdomen soft, fundus firm, nontender                 Lower extremities are negative for swelling, cords or tenderness. Labs:     Lab Results   Component Value Date/Time    WBC 8.6 2019 07:13 PM    WBC 9.1 2019 03:27 PM    HGB 12.4 2019 07:13 PM    HGB 12.1 2019 03:27 PM    HCT 39.3 2019 07:13 PM    HCT 39.5 2019 03:27 PM    PLATELET 203 (L)  07:13 PM    PLATELET 917 (L)  03:27 PM       No results found for this or any previous visit (from the past 24 hour(s)).

## 2019-06-16 NOTE — ROUTINE PROCESS
Bedside and Verbal shift change report given to [address] Dyson (oncoming nurse) by Everette Mauricio RN (offgoing nurse). Report included the following information SBAR.

## 2020-07-09 ENCOUNTER — APPOINTMENT (OUTPATIENT)
Dept: GENERAL RADIOLOGY | Age: 35
End: 2020-07-09
Attending: NEUROLOGICAL SURGERY
Payer: COMMERCIAL

## 2020-07-09 ENCOUNTER — APPOINTMENT (OUTPATIENT)
Dept: CT IMAGING | Age: 35
End: 2020-07-09
Attending: EMERGENCY MEDICINE
Payer: COMMERCIAL

## 2020-07-09 ENCOUNTER — APPOINTMENT (OUTPATIENT)
Dept: MRI IMAGING | Age: 35
End: 2020-07-09
Attending: NURSE PRACTITIONER
Payer: COMMERCIAL

## 2020-07-09 ENCOUNTER — ANESTHESIA EVENT (OUTPATIENT)
Dept: SURGERY | Age: 35
End: 2020-07-09
Payer: COMMERCIAL

## 2020-07-09 ENCOUNTER — ANESTHESIA (OUTPATIENT)
Dept: SURGERY | Age: 35
End: 2020-07-09
Payer: COMMERCIAL

## 2020-07-09 ENCOUNTER — HOSPITAL ENCOUNTER (OUTPATIENT)
Age: 35
Setting detail: OBSERVATION
Discharge: HOME | End: 2020-07-10
Attending: EMERGENCY MEDICINE | Admitting: NEUROLOGICAL SURGERY
Payer: COMMERCIAL

## 2020-07-09 DIAGNOSIS — Z87.39 S/P DISCECTOMY FOR HERNIATED NUCLEUS PULPOSUS: Primary | ICD-10-CM

## 2020-07-09 DIAGNOSIS — Z98.890 S/P DISCECTOMY FOR HERNIATED NUCLEUS PULPOSUS: Primary | ICD-10-CM

## 2020-07-09 PROBLEM — M51.26 LUMBAR HERNIATED DISC: Status: ACTIVE | Noted: 2020-07-09

## 2020-07-09 LAB
ANION GAP SERPL CALC-SCNC: 6 MMOL/L (ref 5–15)
APPEARANCE UR: CLEAR
BACTERIA URNS QL MICRO: NEGATIVE /HPF
BASOPHILS # BLD: 0 K/UL (ref 0–0.1)
BASOPHILS NFR BLD: 1 % (ref 0–1)
BILIRUB UR QL: NEGATIVE
BUN SERPL-MCNC: 12 MG/DL (ref 6–20)
BUN/CREAT SERPL: 19 (ref 12–20)
CALCIUM SERPL-MCNC: 8.9 MG/DL (ref 8.5–10.1)
CHLORIDE SERPL-SCNC: 110 MMOL/L (ref 97–108)
CO2 SERPL-SCNC: 23 MMOL/L (ref 21–32)
COLOR UR: ABNORMAL
COMMENT, HOLDF: NORMAL
COVID-19 RAPID TEST, COVR: NOT DETECTED
CREAT SERPL-MCNC: 0.64 MG/DL (ref 0.55–1.02)
DIFFERENTIAL METHOD BLD: NORMAL
EOSINOPHIL # BLD: 0.2 K/UL (ref 0–0.4)
EOSINOPHIL NFR BLD: 3 % (ref 0–7)
EPITH CASTS URNS QL MICRO: ABNORMAL /LPF
ERYTHROCYTE [DISTWIDTH] IN BLOOD BY AUTOMATED COUNT: 12.8 % (ref 11.5–14.5)
GLUCOSE SERPL-MCNC: 90 MG/DL (ref 65–100)
GLUCOSE UR STRIP.AUTO-MCNC: NEGATIVE MG/DL
HCG UR QL: NEGATIVE
HCT VFR BLD AUTO: 45.6 % (ref 35–47)
HGB BLD-MCNC: 14.8 G/DL (ref 11.5–16)
HGB UR QL STRIP: NEGATIVE
HYALINE CASTS URNS QL MICRO: ABNORMAL /LPF (ref 0–5)
IMM GRANULOCYTES # BLD AUTO: 0 K/UL (ref 0–0.04)
IMM GRANULOCYTES NFR BLD AUTO: 0 % (ref 0–0.5)
INR PPP: 1 (ref 0.9–1.1)
KETONES UR QL STRIP.AUTO: NEGATIVE MG/DL
LEUKOCYTE ESTERASE UR QL STRIP.AUTO: ABNORMAL
LYMPHOCYTES # BLD: 1.3 K/UL (ref 0.8–3.5)
LYMPHOCYTES NFR BLD: 25 % (ref 12–49)
MCH RBC QN AUTO: 30.7 PG (ref 26–34)
MCHC RBC AUTO-ENTMCNC: 32.5 G/DL (ref 30–36.5)
MCV RBC AUTO: 94.6 FL (ref 80–99)
MONOCYTES # BLD: 0.4 K/UL (ref 0–1)
MONOCYTES NFR BLD: 8 % (ref 5–13)
NEUTS SEG # BLD: 3.3 K/UL (ref 1.8–8)
NEUTS SEG NFR BLD: 63 % (ref 32–75)
NITRITE UR QL STRIP.AUTO: NEGATIVE
NRBC # BLD: 0 K/UL (ref 0–0.01)
NRBC BLD-RTO: 0 PER 100 WBC
PH UR STRIP: 5.5 [PH] (ref 5–8)
PLATELET # BLD AUTO: 181 K/UL (ref 150–400)
PMV BLD AUTO: 10.9 FL (ref 8.9–12.9)
POTASSIUM SERPL-SCNC: 4.1 MMOL/L (ref 3.5–5.1)
PROT UR STRIP-MCNC: NEGATIVE MG/DL
PROTHROMBIN TIME: 10.7 SEC (ref 9–11.1)
RBC # BLD AUTO: 4.82 M/UL (ref 3.8–5.2)
RBC #/AREA URNS HPF: ABNORMAL /HPF (ref 0–5)
SAMPLES BEING HELD,HOLD: NORMAL
SARS-COV-2, COV2: NOT DETECTED
SODIUM SERPL-SCNC: 139 MMOL/L (ref 136–145)
SOURCE, COVRS: NORMAL
SP GR UR REFRACTOMETRY: 1.02 (ref 1–1.03)
SPECIMEN SOURCE, FCOV2M: NORMAL
SPECIMEN SOURCE, FCOV2M: NORMAL
UA: UC IF INDICATED,UAUC: ABNORMAL
UROBILINOGEN UR QL STRIP.AUTO: 1 EU/DL (ref 0.2–1)
WBC # BLD AUTO: 5.2 K/UL (ref 3.6–11)
WBC URNS QL MICRO: ABNORMAL /HPF (ref 0–4)

## 2020-07-09 PROCEDURE — 77030018836 HC SOL IRR NACL ICUM -A: Performed by: NEUROLOGICAL SURGERY

## 2020-07-09 PROCEDURE — 74011250636 HC RX REV CODE- 250/636: Performed by: NURSE ANESTHETIST, CERTIFIED REGISTERED

## 2020-07-09 PROCEDURE — 87635 SARS-COV-2 COVID-19 AMP PRB: CPT

## 2020-07-09 PROCEDURE — 77030013079 HC BLNKT BAIR HGGR 3M -A: Performed by: ANESTHESIOLOGY

## 2020-07-09 PROCEDURE — 77030014650 HC SEAL MTRX FLOSEL BAXT -C: Performed by: NEUROLOGICAL SURGERY

## 2020-07-09 PROCEDURE — 76210000017 HC OR PH I REC 1.5 TO 2 HR: Performed by: NEUROLOGICAL SURGERY

## 2020-07-09 PROCEDURE — 77030040356 HC CORD BPLR FRCP COVD -A: Performed by: NEUROLOGICAL SURGERY

## 2020-07-09 PROCEDURE — 77030026438 HC STYL ET INTUB CARD -A: Performed by: ANESTHESIOLOGY

## 2020-07-09 PROCEDURE — 74011250636 HC RX REV CODE- 250/636: Performed by: NURSE PRACTITIONER

## 2020-07-09 PROCEDURE — 74011250636 HC RX REV CODE- 250/636: Performed by: EMERGENCY MEDICINE

## 2020-07-09 PROCEDURE — 85610 PROTHROMBIN TIME: CPT

## 2020-07-09 PROCEDURE — 85025 COMPLETE CBC W/AUTO DIFF WBC: CPT

## 2020-07-09 PROCEDURE — 74011250637 HC RX REV CODE- 250/637: Performed by: ANESTHESIOLOGY

## 2020-07-09 PROCEDURE — 74011250636 HC RX REV CODE- 250/636: Performed by: ANESTHESIOLOGY

## 2020-07-09 PROCEDURE — 77030008684 HC TU ET CUF COVD -B: Performed by: ANESTHESIOLOGY

## 2020-07-09 PROCEDURE — 99218 HC RM OBSERVATION: CPT

## 2020-07-09 PROCEDURE — 72131 CT LUMBAR SPINE W/O DYE: CPT

## 2020-07-09 PROCEDURE — 72148 MRI LUMBAR SPINE W/O DYE: CPT

## 2020-07-09 PROCEDURE — 74011250636 HC RX REV CODE- 250/636

## 2020-07-09 PROCEDURE — 77030003029 HC SUT VCRL J&J -B: Performed by: NEUROLOGICAL SURGERY

## 2020-07-09 PROCEDURE — 77030003028 HC SUT VCRL J&J -A: Performed by: NEUROLOGICAL SURGERY

## 2020-07-09 PROCEDURE — 74011000250 HC RX REV CODE- 250: Performed by: NURSE ANESTHETIST, CERTIFIED REGISTERED

## 2020-07-09 PROCEDURE — 99284 EMERGENCY DEPT VISIT MOD MDM: CPT

## 2020-07-09 PROCEDURE — 74011250636 HC RX REV CODE- 250/636: Performed by: NEUROLOGICAL SURGERY

## 2020-07-09 PROCEDURE — 36415 COLL VENOUS BLD VENIPUNCTURE: CPT

## 2020-07-09 PROCEDURE — 74011000250 HC RX REV CODE- 250: Performed by: NEUROLOGICAL SURGERY

## 2020-07-09 PROCEDURE — 96375 TX/PRO/DX INJ NEW DRUG ADDON: CPT

## 2020-07-09 PROCEDURE — 81001 URINALYSIS AUTO W/SCOPE: CPT

## 2020-07-09 PROCEDURE — 77030004391 HC BUR FLUT MEDT -C: Performed by: NEUROLOGICAL SURGERY

## 2020-07-09 PROCEDURE — 88304 TISSUE EXAM BY PATHOLOGIST: CPT

## 2020-07-09 PROCEDURE — 77030040361 HC SLV COMPR DVT MDII -B: Performed by: NEUROLOGICAL SURGERY

## 2020-07-09 PROCEDURE — 80048 BASIC METABOLIC PNL TOTAL CA: CPT

## 2020-07-09 PROCEDURE — 77030029099 HC BN WAX SSPC -A: Performed by: NEUROLOGICAL SURGERY

## 2020-07-09 PROCEDURE — 76010000162 HC OR TIME 1.5 TO 2 HR INTENSV-TIER 1: Performed by: NEUROLOGICAL SURGERY

## 2020-07-09 PROCEDURE — 81025 URINE PREGNANCY TEST: CPT

## 2020-07-09 PROCEDURE — 74011250637 HC RX REV CODE- 250/637: Performed by: NURSE PRACTITIONER

## 2020-07-09 PROCEDURE — 77030040830 HC CATH URETH FOL MDII -A: Performed by: NEUROLOGICAL SURGERY

## 2020-07-09 PROCEDURE — 96372 THER/PROPH/DIAG INJ SC/IM: CPT

## 2020-07-09 PROCEDURE — 76060000034 HC ANESTHESIA 1.5 TO 2 HR: Performed by: NEUROLOGICAL SURGERY

## 2020-07-09 PROCEDURE — 74011250637 HC RX REV CODE- 250/637: Performed by: EMERGENCY MEDICINE

## 2020-07-09 PROCEDURE — 96374 THER/PROPH/DIAG INJ IV PUSH: CPT

## 2020-07-09 PROCEDURE — 77030019908 HC STETH ESOPH SIMS -A: Performed by: ANESTHESIOLOGY

## 2020-07-09 PROCEDURE — 96376 TX/PRO/DX INJ SAME DRUG ADON: CPT

## 2020-07-09 RX ORDER — POLYETHYLENE GLYCOL 3350 17 G/17G
17 POWDER, FOR SOLUTION ORAL DAILY
Status: DISCONTINUED | OUTPATIENT
Start: 2020-07-10 | End: 2020-07-10 | Stop reason: HOSPADM

## 2020-07-09 RX ORDER — FENTANYL CITRATE 50 UG/ML
INJECTION, SOLUTION INTRAMUSCULAR; INTRAVENOUS AS NEEDED
Status: DISCONTINUED | OUTPATIENT
Start: 2020-07-09 | End: 2020-07-09 | Stop reason: HOSPADM

## 2020-07-09 RX ORDER — OXYCODONE HYDROCHLORIDE 5 MG/1
5 TABLET ORAL AS NEEDED
Status: DISCONTINUED | OUTPATIENT
Start: 2020-07-09 | End: 2020-07-09 | Stop reason: HOSPADM

## 2020-07-09 RX ORDER — NORETHINDRONE ACETATE AND ETHINYL ESTRADIOL, ETHINYL ESTRADIOL AND FERROUS FUMARATE 1MG-10(24)
1 KIT ORAL
COMMUNITY

## 2020-07-09 RX ORDER — FENTANYL CITRATE 50 UG/ML
INJECTION, SOLUTION INTRAMUSCULAR; INTRAVENOUS
Status: COMPLETED
Start: 2020-07-09 | End: 2020-07-09

## 2020-07-09 RX ORDER — ONDANSETRON 4 MG/1
4 TABLET, ORALLY DISINTEGRATING ORAL
Status: DISCONTINUED | OUTPATIENT
Start: 2020-07-09 | End: 2020-07-10 | Stop reason: SDUPTHER

## 2020-07-09 RX ORDER — LIDOCAINE HYDROCHLORIDE 20 MG/ML
INJECTION, SOLUTION EPIDURAL; INFILTRATION; INTRACAUDAL; PERINEURAL AS NEEDED
Status: DISCONTINUED | OUTPATIENT
Start: 2020-07-09 | End: 2020-07-09 | Stop reason: HOSPADM

## 2020-07-09 RX ORDER — FACIAL-BODY WIPES
10 EACH TOPICAL DAILY PRN
Status: DISCONTINUED | OUTPATIENT
Start: 2020-07-09 | End: 2020-07-10 | Stop reason: HOSPADM

## 2020-07-09 RX ORDER — OXYCODONE HYDROCHLORIDE 5 MG/1
10 TABLET ORAL
Status: DISCONTINUED | OUTPATIENT
Start: 2020-07-09 | End: 2020-07-10

## 2020-07-09 RX ORDER — ONDANSETRON 4 MG/1
4 TABLET, ORALLY DISINTEGRATING ORAL
Status: DISCONTINUED | OUTPATIENT
Start: 2020-07-09 | End: 2020-07-10 | Stop reason: HOSPADM

## 2020-07-09 RX ORDER — SODIUM CHLORIDE, SODIUM LACTATE, POTASSIUM CHLORIDE, CALCIUM CHLORIDE 600; 310; 30; 20 MG/100ML; MG/100ML; MG/100ML; MG/100ML
25 INJECTION, SOLUTION INTRAVENOUS CONTINUOUS
Status: DISCONTINUED | OUTPATIENT
Start: 2020-07-09 | End: 2020-07-09 | Stop reason: HOSPADM

## 2020-07-09 RX ORDER — CEFAZOLIN SODIUM/WATER 2 G/20 ML
2 SYRINGE (ML) INTRAVENOUS EVERY 8 HOURS
Status: COMPLETED | OUTPATIENT
Start: 2020-07-10 | End: 2020-07-10

## 2020-07-09 RX ORDER — MIDAZOLAM HYDROCHLORIDE 1 MG/ML
0.5 INJECTION, SOLUTION INTRAMUSCULAR; INTRAVENOUS
Status: COMPLETED | OUTPATIENT
Start: 2020-07-09 | End: 2020-07-09

## 2020-07-09 RX ORDER — FENTANYL CITRATE 50 UG/ML
50 INJECTION, SOLUTION INTRAMUSCULAR; INTRAVENOUS AS NEEDED
Status: DISCONTINUED | OUTPATIENT
Start: 2020-07-09 | End: 2020-07-09 | Stop reason: HOSPADM

## 2020-07-09 RX ORDER — HYDROMORPHONE HYDROCHLORIDE 1 MG/ML
1 INJECTION, SOLUTION INTRAMUSCULAR; INTRAVENOUS; SUBCUTANEOUS ONCE
Status: COMPLETED | OUTPATIENT
Start: 2020-07-09 | End: 2020-07-09

## 2020-07-09 RX ORDER — VENLAFAXINE HYDROCHLORIDE 37.5 MG/1
75 CAPSULE, EXTENDED RELEASE ORAL
Status: DISCONTINUED | OUTPATIENT
Start: 2020-07-09 | End: 2020-07-10 | Stop reason: HOSPADM

## 2020-07-09 RX ORDER — SODIUM CHLORIDE, SODIUM LACTATE, POTASSIUM CHLORIDE, CALCIUM CHLORIDE 600; 310; 30; 20 MG/100ML; MG/100ML; MG/100ML; MG/100ML
100 INJECTION, SOLUTION INTRAVENOUS CONTINUOUS
Status: DISCONTINUED | OUTPATIENT
Start: 2020-07-09 | End: 2020-07-09 | Stop reason: HOSPADM

## 2020-07-09 RX ORDER — SODIUM CHLORIDE 0.9 % (FLUSH) 0.9 %
5-40 SYRINGE (ML) INJECTION AS NEEDED
Status: DISCONTINUED | OUTPATIENT
Start: 2020-07-09 | End: 2020-07-09 | Stop reason: HOSPADM

## 2020-07-09 RX ORDER — SODIUM CHLORIDE, SODIUM LACTATE, POTASSIUM CHLORIDE, CALCIUM CHLORIDE 600; 310; 30; 20 MG/100ML; MG/100ML; MG/100ML; MG/100ML
INJECTION, SOLUTION INTRAVENOUS
Status: DISCONTINUED | OUTPATIENT
Start: 2020-07-09 | End: 2020-07-09 | Stop reason: HOSPADM

## 2020-07-09 RX ORDER — DIAZEPAM 5 MG/1
5 TABLET ORAL
Status: COMPLETED | OUTPATIENT
Start: 2020-07-09 | End: 2020-07-09

## 2020-07-09 RX ORDER — ACETAMINOPHEN 325 MG/1
650 TABLET ORAL EVERY 6 HOURS
Status: DISCONTINUED | OUTPATIENT
Start: 2020-07-10 | End: 2020-07-10 | Stop reason: HOSPADM

## 2020-07-09 RX ORDER — MIDAZOLAM HYDROCHLORIDE 1 MG/ML
1 INJECTION, SOLUTION INTRAMUSCULAR; INTRAVENOUS AS NEEDED
Status: DISCONTINUED | OUTPATIENT
Start: 2020-07-09 | End: 2020-07-09 | Stop reason: HOSPADM

## 2020-07-09 RX ORDER — HYDROMORPHONE HYDROCHLORIDE 1 MG/ML
0.5 INJECTION, SOLUTION INTRAMUSCULAR; INTRAVENOUS; SUBCUTANEOUS ONCE
Status: COMPLETED | OUTPATIENT
Start: 2020-07-09 | End: 2020-07-09

## 2020-07-09 RX ORDER — DEXMEDETOMIDINE HYDROCHLORIDE 100 UG/ML
INJECTION, SOLUTION INTRAVENOUS AS NEEDED
Status: DISCONTINUED | OUTPATIENT
Start: 2020-07-09 | End: 2020-07-09 | Stop reason: HOSPADM

## 2020-07-09 RX ORDER — HYDROMORPHONE HYDROCHLORIDE 1 MG/ML
0.2 INJECTION, SOLUTION INTRAMUSCULAR; INTRAVENOUS; SUBCUTANEOUS
Status: DISCONTINUED | OUTPATIENT
Start: 2020-07-09 | End: 2020-07-09 | Stop reason: HOSPADM

## 2020-07-09 RX ORDER — SODIUM CHLORIDE 9 MG/ML
125 INJECTION, SOLUTION INTRAVENOUS CONTINUOUS
Status: DISCONTINUED | OUTPATIENT
Start: 2020-07-09 | End: 2020-07-10 | Stop reason: HOSPADM

## 2020-07-09 RX ORDER — ACETAMINOPHEN 325 MG/1
650 TABLET ORAL
Status: DISCONTINUED | OUTPATIENT
Start: 2020-07-09 | End: 2020-07-10 | Stop reason: HOSPADM

## 2020-07-09 RX ORDER — DULOXETIN HYDROCHLORIDE 20 MG/1
40 CAPSULE, DELAYED RELEASE ORAL DAILY
Status: DISCONTINUED | OUTPATIENT
Start: 2020-07-10 | End: 2020-07-09

## 2020-07-09 RX ORDER — AMOXICILLIN 250 MG
1 CAPSULE ORAL DAILY
Status: DISCONTINUED | OUTPATIENT
Start: 2020-07-10 | End: 2020-07-10 | Stop reason: HOSPADM

## 2020-07-09 RX ORDER — DEXAMETHASONE SODIUM PHOSPHATE 4 MG/ML
INJECTION, SOLUTION INTRA-ARTICULAR; INTRALESIONAL; INTRAMUSCULAR; INTRAVENOUS; SOFT TISSUE AS NEEDED
Status: DISCONTINUED | OUTPATIENT
Start: 2020-07-09 | End: 2020-07-09 | Stop reason: HOSPADM

## 2020-07-09 RX ORDER — FENTANYL CITRATE 50 UG/ML
25 INJECTION, SOLUTION INTRAMUSCULAR; INTRAVENOUS
Status: COMPLETED | OUTPATIENT
Start: 2020-07-09 | End: 2020-07-09

## 2020-07-09 RX ORDER — MIDAZOLAM HYDROCHLORIDE 1 MG/ML
INJECTION, SOLUTION INTRAMUSCULAR; INTRAVENOUS AS NEEDED
Status: DISCONTINUED | OUTPATIENT
Start: 2020-07-09 | End: 2020-07-09 | Stop reason: HOSPADM

## 2020-07-09 RX ORDER — PROPOFOL 10 MG/ML
INJECTION, EMULSION INTRAVENOUS AS NEEDED
Status: DISCONTINUED | OUTPATIENT
Start: 2020-07-09 | End: 2020-07-09 | Stop reason: HOSPADM

## 2020-07-09 RX ORDER — SODIUM CHLORIDE 0.9 % (FLUSH) 0.9 %
5-40 SYRINGE (ML) INJECTION EVERY 8 HOURS
Status: DISCONTINUED | OUTPATIENT
Start: 2020-07-09 | End: 2020-07-09 | Stop reason: HOSPADM

## 2020-07-09 RX ORDER — OXYCODONE HYDROCHLORIDE 5 MG/1
5 TABLET ORAL
Status: DISCONTINUED | OUTPATIENT
Start: 2020-07-09 | End: 2020-07-10

## 2020-07-09 RX ORDER — ONDANSETRON 2 MG/ML
INJECTION INTRAMUSCULAR; INTRAVENOUS AS NEEDED
Status: DISCONTINUED | OUTPATIENT
Start: 2020-07-09 | End: 2020-07-09 | Stop reason: HOSPADM

## 2020-07-09 RX ORDER — BUPIVACAINE HYDROCHLORIDE AND EPINEPHRINE 5; 5 MG/ML; UG/ML
INJECTION, SOLUTION EPIDURAL; INTRACAUDAL; PERINEURAL AS NEEDED
Status: DISCONTINUED | OUTPATIENT
Start: 2020-07-09 | End: 2020-07-09 | Stop reason: HOSPADM

## 2020-07-09 RX ORDER — SODIUM CHLORIDE 9 MG/ML
75 INJECTION, SOLUTION INTRAVENOUS CONTINUOUS
Status: DISCONTINUED | OUTPATIENT
Start: 2020-07-09 | End: 2020-07-10 | Stop reason: HOSPADM

## 2020-07-09 RX ORDER — ROPIVACAINE HYDROCHLORIDE 5 MG/ML
30 INJECTION, SOLUTION EPIDURAL; INFILTRATION; PERINEURAL AS NEEDED
Status: DISCONTINUED | OUTPATIENT
Start: 2020-07-09 | End: 2020-07-09 | Stop reason: HOSPADM

## 2020-07-09 RX ORDER — HYDROCODONE BITARTRATE AND ACETAMINOPHEN 5; 325 MG/1; MG/1
1 TABLET ORAL ONCE
Status: COMPLETED | OUTPATIENT
Start: 2020-07-09 | End: 2020-07-09

## 2020-07-09 RX ORDER — NEOSTIGMINE METHYLSULFATE 1 MG/ML
INJECTION INTRAVENOUS AS NEEDED
Status: DISCONTINUED | OUTPATIENT
Start: 2020-07-09 | End: 2020-07-09 | Stop reason: HOSPADM

## 2020-07-09 RX ORDER — KETOROLAC TROMETHAMINE 30 MG/ML
30 INJECTION, SOLUTION INTRAMUSCULAR; INTRAVENOUS
Status: COMPLETED | OUTPATIENT
Start: 2020-07-09 | End: 2020-07-09

## 2020-07-09 RX ORDER — MORPHINE SULFATE 2 MG/ML
2 INJECTION, SOLUTION INTRAMUSCULAR; INTRAVENOUS
Status: DISCONTINUED | OUTPATIENT
Start: 2020-07-09 | End: 2020-07-10 | Stop reason: HOSPADM

## 2020-07-09 RX ORDER — SODIUM CHLORIDE 0.9 % (FLUSH) 0.9 %
5-40 SYRINGE (ML) INJECTION AS NEEDED
Status: DISCONTINUED | OUTPATIENT
Start: 2020-07-09 | End: 2020-07-10 | Stop reason: HOSPADM

## 2020-07-09 RX ORDER — GLYCOPYRROLATE 0.2 MG/ML
INJECTION INTRAMUSCULAR; INTRAVENOUS AS NEEDED
Status: DISCONTINUED | OUTPATIENT
Start: 2020-07-09 | End: 2020-07-09 | Stop reason: HOSPADM

## 2020-07-09 RX ORDER — ONDANSETRON 2 MG/ML
4 INJECTION INTRAMUSCULAR; INTRAVENOUS AS NEEDED
Status: DISCONTINUED | OUTPATIENT
Start: 2020-07-09 | End: 2020-07-09 | Stop reason: HOSPADM

## 2020-07-09 RX ORDER — MORPHINE SULFATE 10 MG/ML
2 INJECTION, SOLUTION INTRAMUSCULAR; INTRAVENOUS
Status: DISCONTINUED | OUTPATIENT
Start: 2020-07-09 | End: 2020-07-09 | Stop reason: HOSPADM

## 2020-07-09 RX ORDER — CEFAZOLIN SODIUM 1 G/3ML
INJECTION, POWDER, FOR SOLUTION INTRAMUSCULAR; INTRAVENOUS AS NEEDED
Status: DISCONTINUED | OUTPATIENT
Start: 2020-07-09 | End: 2020-07-09 | Stop reason: HOSPADM

## 2020-07-09 RX ORDER — ACETAMINOPHEN 325 MG/1
650 TABLET ORAL ONCE
Status: COMPLETED | OUTPATIENT
Start: 2020-07-09 | End: 2020-07-09

## 2020-07-09 RX ORDER — SODIUM CHLORIDE 9 MG/ML
25 INJECTION, SOLUTION INTRAVENOUS CONTINUOUS
Status: DISCONTINUED | OUTPATIENT
Start: 2020-07-09 | End: 2020-07-09 | Stop reason: HOSPADM

## 2020-07-09 RX ORDER — NALOXONE HYDROCHLORIDE 0.4 MG/ML
0.4 INJECTION, SOLUTION INTRAMUSCULAR; INTRAVENOUS; SUBCUTANEOUS AS NEEDED
Status: DISCONTINUED | OUTPATIENT
Start: 2020-07-09 | End: 2020-07-10 | Stop reason: HOSPADM

## 2020-07-09 RX ORDER — ROCURONIUM BROMIDE 10 MG/ML
INJECTION, SOLUTION INTRAVENOUS AS NEEDED
Status: DISCONTINUED | OUTPATIENT
Start: 2020-07-09 | End: 2020-07-09 | Stop reason: HOSPADM

## 2020-07-09 RX ORDER — LIDOCAINE HYDROCHLORIDE 10 MG/ML
0.1 INJECTION, SOLUTION EPIDURAL; INFILTRATION; INTRACAUDAL; PERINEURAL AS NEEDED
Status: DISCONTINUED | OUTPATIENT
Start: 2020-07-09 | End: 2020-07-09 | Stop reason: HOSPADM

## 2020-07-09 RX ORDER — DULOXETIN HYDROCHLORIDE 20 MG/1
40 CAPSULE, DELAYED RELEASE ORAL
Status: DISCONTINUED | OUTPATIENT
Start: 2020-07-09 | End: 2020-07-10 | Stop reason: HOSPADM

## 2020-07-09 RX ORDER — DIPHENHYDRAMINE HYDROCHLORIDE 50 MG/ML
12.5 INJECTION, SOLUTION INTRAMUSCULAR; INTRAVENOUS AS NEEDED
Status: DISCONTINUED | OUTPATIENT
Start: 2020-07-09 | End: 2020-07-09 | Stop reason: HOSPADM

## 2020-07-09 RX ORDER — PHENYLEPHRINE HCL IN 0.9% NACL 0.4MG/10ML
SYRINGE (ML) INTRAVENOUS AS NEEDED
Status: DISCONTINUED | OUTPATIENT
Start: 2020-07-09 | End: 2020-07-09 | Stop reason: HOSPADM

## 2020-07-09 RX ORDER — SODIUM CHLORIDE 0.9 % (FLUSH) 0.9 %
5-40 SYRINGE (ML) INJECTION EVERY 8 HOURS
Status: DISCONTINUED | OUTPATIENT
Start: 2020-07-09 | End: 2020-07-10 | Stop reason: HOSPADM

## 2020-07-09 RX ADMIN — NEOSTIGMINE METHYLSULFATE 3 MG: 1 INJECTION, SOLUTION INTRAVENOUS at 18:29

## 2020-07-09 RX ADMIN — VENLAFAXINE HYDROCHLORIDE 75 MG: 37.5 CAPSULE, EXTENDED RELEASE ORAL at 21:45

## 2020-07-09 RX ADMIN — DEXMEDETOMIDINE HYDROCHLORIDE 8 MCG: 100 INJECTION, SOLUTION, CONCENTRATE INTRAVENOUS at 17:08

## 2020-07-09 RX ADMIN — FENTANYL CITRATE 25 MCG: 50 INJECTION INTRAMUSCULAR; INTRAVENOUS at 18:55

## 2020-07-09 RX ADMIN — MORPHINE SULFATE 2 MG: 2 INJECTION, SOLUTION INTRAMUSCULAR; INTRAVENOUS at 13:57

## 2020-07-09 RX ADMIN — HYDROMORPHONE HYDROCHLORIDE 0.2 MG: 1 INJECTION, SOLUTION INTRAMUSCULAR; INTRAVENOUS; SUBCUTANEOUS at 20:00

## 2020-07-09 RX ADMIN — DEXAMETHASONE SODIUM PHOSPHATE 4 MG: 4 INJECTION, SOLUTION INTRAMUSCULAR; INTRAVENOUS at 17:34

## 2020-07-09 RX ADMIN — ROCURONIUM BROMIDE 50 MG: 10 SOLUTION INTRAVENOUS at 17:15

## 2020-07-09 RX ADMIN — PROPOFOL 200 MG: 10 INJECTION, EMULSION INTRAVENOUS at 17:15

## 2020-07-09 RX ADMIN — FENTANYL CITRATE 25 MCG: 50 INJECTION INTRAMUSCULAR; INTRAVENOUS at 19:05

## 2020-07-09 RX ADMIN — MIDAZOLAM 0.5 MG: 1 INJECTION INTRAMUSCULAR; INTRAVENOUS at 19:10

## 2020-07-09 RX ADMIN — Medication 80 MCG: at 18:22

## 2020-07-09 RX ADMIN — MEPERIDINE HYDROCHLORIDE 12.5 MG: 25 INJECTION INTRAMUSCULAR; INTRAVENOUS; SUBCUTANEOUS at 19:26

## 2020-07-09 RX ADMIN — CEFAZOLIN 2 G: 330 INJECTION, POWDER, FOR SOLUTION INTRAMUSCULAR; INTRAVENOUS at 17:33

## 2020-07-09 RX ADMIN — MORPHINE SULFATE 2 MG: 10 INJECTION INTRAVENOUS at 19:10

## 2020-07-09 RX ADMIN — Medication 10 ML: at 21:46

## 2020-07-09 RX ADMIN — HYDROMORPHONE HYDROCHLORIDE 0.2 MG: 1 INJECTION, SOLUTION INTRAMUSCULAR; INTRAVENOUS; SUBCUTANEOUS at 19:30

## 2020-07-09 RX ADMIN — MORPHINE SULFATE 2 MG: 2 INJECTION, SOLUTION INTRAMUSCULAR; INTRAVENOUS at 22:21

## 2020-07-09 RX ADMIN — HYDROMORPHONE HYDROCHLORIDE 1 MG: 1 INJECTION, SOLUTION INTRAMUSCULAR; INTRAVENOUS; SUBCUTANEOUS at 11:35

## 2020-07-09 RX ADMIN — FENTANYL CITRATE 100 MCG: 50 INJECTION, SOLUTION INTRAMUSCULAR; INTRAVENOUS at 17:15

## 2020-07-09 RX ADMIN — Medication 80 MCG: at 18:30

## 2020-07-09 RX ADMIN — SODIUM CHLORIDE 125 ML/HR: 900 INJECTION, SOLUTION INTRAVENOUS at 19:30

## 2020-07-09 RX ADMIN — GLYCOPYRROLATE 0.4 MG: 0.2 INJECTION, SOLUTION INTRAMUSCULAR; INTRAVENOUS at 18:29

## 2020-07-09 RX ADMIN — MORPHINE SULFATE 2 MG: 10 INJECTION INTRAVENOUS at 19:50

## 2020-07-09 RX ADMIN — MORPHINE SULFATE 2 MG: 10 INJECTION INTRAVENOUS at 19:45

## 2020-07-09 RX ADMIN — HYDROMORPHONE HYDROCHLORIDE 0.2 MG: 1 INJECTION, SOLUTION INTRAMUSCULAR; INTRAVENOUS; SUBCUTANEOUS at 19:45

## 2020-07-09 RX ADMIN — HYDROCODONE BITARTRATE AND ACETAMINOPHEN 1 TABLET: 5; 325 TABLET ORAL at 08:01

## 2020-07-09 RX ADMIN — MORPHINE SULFATE 2 MG: 10 INJECTION INTRAVENOUS at 19:30

## 2020-07-09 RX ADMIN — ONDANSETRON HYDROCHLORIDE 4 MG: 2 INJECTION, SOLUTION INTRAMUSCULAR; INTRAVENOUS at 18:28

## 2020-07-09 RX ADMIN — MIDAZOLAM 0.5 MG: 1 INJECTION INTRAMUSCULAR; INTRAVENOUS at 19:00

## 2020-07-09 RX ADMIN — FENTANYL CITRATE 25 MCG: 50 INJECTION INTRAMUSCULAR; INTRAVENOUS at 19:00

## 2020-07-09 RX ADMIN — DULOXETINE 40 MG: 20 CAPSULE, DELAYED RELEASE ORAL at 21:45

## 2020-07-09 RX ADMIN — MIDAZOLAM 0.5 MG: 1 INJECTION INTRAMUSCULAR; INTRAVENOUS at 19:05

## 2020-07-09 RX ADMIN — ACETAMINOPHEN 650 MG: 325 TABLET ORAL at 17:08

## 2020-07-09 RX ADMIN — HYDROMORPHONE HYDROCHLORIDE 0.5 MG: 1 INJECTION, SOLUTION INTRAMUSCULAR; INTRAVENOUS; SUBCUTANEOUS at 08:53

## 2020-07-09 RX ADMIN — SODIUM CHLORIDE, POTASSIUM CHLORIDE, SODIUM LACTATE AND CALCIUM CHLORIDE: 600; 310; 30; 20 INJECTION, SOLUTION INTRAVENOUS at 17:40

## 2020-07-09 RX ADMIN — FENTANYL CITRATE 25 MCG: 50 INJECTION INTRAMUSCULAR; INTRAVENOUS at 19:10

## 2020-07-09 RX ADMIN — KETOROLAC TROMETHAMINE 30 MG: 30 INJECTION, SOLUTION INTRAMUSCULAR at 08:02

## 2020-07-09 RX ADMIN — MORPHINE SULFATE 2 MG: 10 INJECTION INTRAVENOUS at 19:05

## 2020-07-09 RX ADMIN — SODIUM CHLORIDE, SODIUM LACTATE, POTASSIUM CHLORIDE, AND CALCIUM CHLORIDE 25 ML/HR: 600; 310; 30; 20 INJECTION, SOLUTION INTRAVENOUS at 17:10

## 2020-07-09 RX ADMIN — MIDAZOLAM 2 MG: 1 INJECTION INTRAMUSCULAR; INTRAVENOUS at 17:08

## 2020-07-09 RX ADMIN — MIDAZOLAM 0.5 MG: 1 INJECTION INTRAMUSCULAR; INTRAVENOUS at 19:15

## 2020-07-09 RX ADMIN — DEXMEDETOMIDINE HYDROCHLORIDE 4 MCG: 100 INJECTION, SOLUTION, CONCENTRATE INTRAVENOUS at 17:29

## 2020-07-09 RX ADMIN — LIDOCAINE HYDROCHLORIDE 80 MG: 20 INJECTION, SOLUTION EPIDURAL; INFILTRATION; INTRACAUDAL; PERINEURAL at 17:15

## 2020-07-09 RX ADMIN — SODIUM CHLORIDE 75 ML/HR: 900 INJECTION, SOLUTION INTRAVENOUS at 13:57

## 2020-07-09 RX ADMIN — DIAZEPAM 5 MG: 5 TABLET ORAL at 08:01

## 2020-07-09 NOTE — ROUTINE PROCESS
TRANSFER - OUT REPORT:    Verbal report given to Jessica GAGNON(name) on Silverio Durant  being transferred to (unit) for routine progression of care       Report consisted of patients Situation, Background, Assessment and   Recommendations(SBAR). Information from the following report(s) SBAR, Kardex, ED Summary, [facility] and Recent Results was reviewed with the receiving nurse. Lines:   Peripheral IV 07/09/20 Right Antecubital (Active)   Site Assessment Clean, dry, & intact 07/09/20 0850   Phlebitis Assessment 0 07/09/20 0850   Infiltration Assessment 0 07/09/20 0850   Dressing Status Clean, dry, & intact 07/09/20 0850        Opportunity for questions and clarification was provided.       Patient transported with:   [facility]

## 2020-07-09 NOTE — ED NOTES
Verbal shift change report given to Alethea Hilton RN (oncoming nurse) by Emily Marshall RN (offgoing nurse). Report included the following information SBAR and ED Summary.

## 2020-07-09 NOTE — CONSULTS
[address] Name:  Marissa Hardy 
MR#:  [id number] :  1985 ACCOUNT #:  [de-identified] DATE OF SERVICE:  2020 CHIEF COMPLAINT:  A 27-year-old woman that I saw earlier this morning in the emergency room at the request of Dr. Serenity Barreto for surgical evaluation of a large disc herniation at L5-S1 on the left. HISTORY OF PRESENT ILLNESS:  The patient states that since January, seven months ago, she has had low back pain radiating to the left leg, occasionally the right leg. She has been followed by [facility]. She thinks she had an MRI done at that time, but she does not know what the results were. It may have been done at [address] [place] [address], not at one of the local hospitals. She denies any bowel and bladder deficits. She has had a full course of physical therapy with no relief. She just finished her fifth injection to the lumbar spine and/or sacral iliac region with no relief of her symptoms. Starting about a week ago, she started developing severe pain that radiated down the left leg, and today she came to the emergency room when she just could not stand the pain any longer. She feels a little weak in the leg on the left side as well. She was given several doses of narcotic analgesics with no relief of her symptoms. She has tried oral steroids in the form of Medrol Claude sometime over the last several months with no improvement as well. PAST MEDICAL HISTORY:  Unremarkable. She has anxiety disorder for which she takes Cymbalta, otherwise she takes no other medications. PAST SURGICAL HISTORY:  She had a breast augmentation in . ALLERGIES:  NO KNOWN DRUG ALLERGIES. SOCIAL HISTORY:  She does not smoke or use alcohol. She is an educator. PHYSICAL EXAMINATION: 
GENERAL:  She is awake and alert, oriented to person, place, and time. VITAL SIGNS:  Most recent blood pressure 119/83, pulse rate 94, respiratory rate 18. NEUROLOGIC:  She does appear to be in a fair amount of discomfort secondary to her back pain. Straight leg raising is limited to less than 20 degrees and reproduces severe left leg pain. She has weakness on plantar flexion at about 4/5 strength of the left foot and decreased sensation along the S1 distribution to light touch. She has an absent ankle jerk reflex bilaterally. The remainder of her neurologic exam is normal.  Cranial nerve function II-XII is normal.  Speech is fluent. Mood and affect are normal. 
 
IMAGING STUDIES:  Include a CT scan of the lumbar spine that shows a large disc herniation at L5-S1. I ordered an MRI of lumbar region. The report reveals an L5-S1 left to central disc herniation confirming the findings on the CT scan. I had the opportunity to review the MRI and it shows a very large free fragment disc herniation at L5-S1. This is not going to improve in my opinion with further conservative measures. I recommended lumbar laminectomy discectomy at the L5-S1 interspace level on the left side. I described the risks and benefits, I think that she should be operated on today since she is at risk of a cauda equina syndrome given the large disc herniation. I have discussed this with her, she wishes to proceed. I have informed the Operating Room, and we will go ahead and get this done today. MD VELASQUEZ [email or web address] 
D:  07/09/2020 15:52 
T:  07/09/2020 17:29 
JOB #:  [id number] CC:  Debo Law MD

## 2020-07-09 NOTE — ED NOTES
Received call from lab. Pt's rapid covid test was negative. Charge RN and nursing supervisor notified for bed placement.

## 2020-07-09 NOTE — PROGRESS NOTES
I reviewed the MRI  It confirms a large disc herniation at L5S1 Recommend lumbar laminectomy and discectomy on left at that level.  I have informed the OR and will schedule her for today

## 2020-07-09 NOTE — ROUTINE PROCESS
TRANSFER - IN REPORT:    Verbal report received from [address] [place] [address] RN(name) on Ivana Inkom  being received from [facility](unit) for ordered procedure      Report consisted of patients Situation, Background, Assessment and   Recommendations(SBAR). Information from the following report(s) SBAR, Kardex, ED Summary, Procedure Summary, Intake/Output, MAR and Recent Results was reviewed with the receiving nurse. Opportunity for questions and clarification was provided. Assessment completed upon patients arrival to unit and care assumed.

## 2020-07-09 NOTE — PROGRESS NOTES
I just spoke with Dr Servando Landeros and saw the CT as well. Pt will likely need surgery for the very large HNP at L5S1 and she has required IV narcotic pain meds in ER today with little control of her pain.  She also needs an MRI, I've ordered that already and will see pt after it is done

## 2020-07-09 NOTE — ANESTHESIA PREPROCEDURE EVALUATION
Relevant Problems   No relevant active problems       Anesthetic History   No history of anesthetic complications            Review of Systems / Medical History  Patient summary reviewed, nursing notes reviewed and pertinent labs reviewed    Pulmonary  Within defined limits                 Neuro/Psych   Within defined limits           Cardiovascular  Within defined limits                Exercise tolerance: >4 METS     GI/Hepatic/Renal  Within defined limits              Endo/Other  Within defined limits           Other Findings   Comments: Herniated disc           Physical Exam    Airway  Mallampati: II  TM Distance: 4 - 6 cm  Neck ROM: normal range of motion   Mouth opening: Normal     Cardiovascular  Regular rate and rhythm,  S1 and S2 normal,  no murmur, click, rub, or gallop             Dental  No notable dental hx       Pulmonary  Breath sounds clear to auscultation               Abdominal  GI exam deferred       Other Findings            Anesthetic Plan    ASA: 1  Anesthesia type: general          Induction: Intravenous  Anesthetic plan and risks discussed with: Patient

## 2020-07-09 NOTE — PROGRESS NOTES
Admission Medication Reconciliation:    Information obtained from:  Patient via secure telephone line  RxQuery data available¹:  NO    Comments/Recommendations: Updated PTA meds/reviewed patient's allergies. 1)  Patient is an excellent historian and provided a review of medication regimens and last doses. 2)  Medication changes (since last review): Added  - Lo Loestrin BCP    Adjusted  - Cymbalta and Pristiq to HS dosing    Removed  - Ibuprofen   - Prenatal    3)  Confirmed NKDA status     ¹RxQuery pharmacy benefit data reflects medications filled and processed through the patient's insurance, however   this data does NOT capture whether the medication was picked up or is currently being taken by the patient. Allergies:  Patient has no known allergies. Significant PMH/Disease States:   Past Medical History:   Diagnosis Date    Abnormal Papanicolaou smear of cervix     follow up normal    Anemia     Breast disorder     augmentation 2004    Psychiatric problem     anxiety     Chief Complaint for this Admission:    Chief Complaint   Patient presents with    Back Pain     Prior to Admission Medications:   Prior to Admission Medications   Prescriptions Last Dose Informant Taking? DULoxetine (CYMBALTA) 20 mg capsule 7/8/2020 at HS  Yes   Sig: Take 40 mg by mouth nightly. desvenlafaxine succinate (PRISTIQ) 50 mg ER tablet 7/8/2020 at HS  Yes   Sig: Take 50 mg by mouth nightly. norethindrone-e.estradioL-iron (Lo Loestrin Fe) 1 mg-10 mcg (24)/10 mcg (2) tab 7/8/2020 at HS  Yes   Sig: Take 1 Tab by mouth nightly. Facility-Administered Medications: None     Please contact the main inpatient pharmacy with any questions or concerns at [phone] and we will direct you to the clinical pharmacist covering this patient's care while in-house.    Alonzo Voss, PHARMD

## 2020-07-09 NOTE — ED PROVIDER NOTES
This is a 22-year-old female with a history of chronic back pain that presents with a chief complaint of back pain. The patient reports that she has had back pain, mostly on the right for some time and has been receiving steroid injections. She reports that over the last 3 days she has developed left-sided back pain that radiates down the left side of her leg. She denies any trauma, fevers, chills, loss of bowel or bladder continence or weakness. She has been able to ambulate with pain.            Past Medical History:   Diagnosis Date    Abnormal Papanicolaou smear of cervix     follow up normal    Anemia     Breast disorder     augmentation 2004    Psychiatric problem     anxiety       Past Surgical History:   Procedure Laterality Date    BREAST SURGERY PROCEDURE UNLISTED      HX OTHER SURGICAL      wisdom teeth         Family History:   Problem Relation Age of Onset    Thyroid Disease Mother     Parkinson's Disease Father     Alzheimer Maternal Grandfather        Social History     Socioeconomic History    Marital status:      Spouse name: Not on file    Number of children: Not on file    Years of education: Not on file    Highest education level: Not on file   Occupational History    Not on file   Social Needs    Financial resource strain: Not on file    Food insecurity     Worry: Not on file     Inability: Not on file    Transportation needs     Medical: Not on file     Non-medical: Not on file   Tobacco Use    Smoking status: Never Smoker    Smokeless tobacco: Never Used   Substance and Sexual Activity    Alcohol use: Not Currently    Drug use: Never    Sexual activity: Yes   Lifestyle    Physical activity     Days per week: Not on file     Minutes per session: Not on file    Stress: Not on file   Relationships    Social connections     Talks on phone: Not on file     Gets together: Not on file     Attends Taoist service: Not on file     Active member of club or organization: Not on file     Attends meetings of clubs or organizations: Not on file     Relationship status: Not on file    Intimate partner violence     Fear of current or ex partner: Not on file     Emotionally abused: Not on file     Physically abused: Not on file     Forced sexual activity: Not on file   Other Topics Concern     Service Not Asked    Blood Transfusions Not Asked    Caffeine Concern Not Asked    Occupational Exposure Not Asked   Gabriele Fent Hazards Not Asked    Sleep Concern Not Asked    Stress Concern Not Asked    Weight Concern Not Asked    Special Diet Not Asked    Back Care Not Asked    Exercise Not Asked    Bike Helmet Not Asked   [address] Not Asked    Self-Exams Not Asked   Social History Narrative    Not on file         ALLERGIES: Patient has no known allergies. Review of Systems   Constitutional: Negative for fever. HENT: Negative for rhinorrhea. Respiratory: Negative for shortness of breath. Cardiovascular: Negative for chest pain. Gastrointestinal: Negative for abdominal pain. Genitourinary: Negative for dysuria. Musculoskeletal: Positive for back pain. Skin: Negative for wound. Neurological: Negative for headaches. Psychiatric/Behavioral: Negative for confusion. Vitals:    07/09/20 0727   BP: 119/83   Pulse: 94   Resp: 18   Temp: 98.1 °F (36.7 °C)   SpO2: 96%   Weight: 71.8 kg (158 lb 4.6 oz)   Height: 5' 7\" (1.702 m)            Physical Exam  Vitals signs and nursing note reviewed. Constitutional:       Appearance: Normal appearance. HENT:      Head: Normocephalic and atraumatic. Neck:      Musculoskeletal: Normal range of motion. Cardiovascular:      Rate and Rhythm: Normal rate and regular rhythm. Pulmonary:      Effort: Pulmonary effort is normal. No respiratory distress. Abdominal:      General: There is no distension. Musculoskeletal:      Comments: No midline lumbar spine tenderness.    Skin:     General: Skin is warm and dry. Capillary Refill: Capillary refill takes less than 2 seconds. Neurological:      Mental Status: She is alert. Comments: 4/5 strength in the left LE. Patient is able to ambulate          MDM  Number of Diagnoses or Management Options  Diagnosis management comments: Patient presents with back pain radiating down her left leg. She does have some weakness in her left lower extremity. Disc herniation is currently a possibility. The patient was treated initially with oral pain medications and muscle relaxers. She failed to receive adequate pain management with this and required several doses of IV narcotics. A CT of the lumbar spine shows a large disc herniation at L5 S1. I spoke with neurosurgery who agreed to admit the patient for MRI and possible surgery. The patient is comfortable and agreeable to plan of care.          Procedures

## 2020-07-09 NOTE — ED TRIAGE NOTES
Pt arrives ambulatory with a lot of difficulty and pain c/o 10/10 sharp L lower back pain that shoots down her L leg accompanied by numbness in the sole and L side of her L foot. She states the pain is worse with movement, denies injury/trauma. Pt states she has been having back pain since January, was received cortisone shots for them. States she has not had the numbness before. Denies CP, SOB, N/V, and urinary or bowel problems.

## 2020-07-10 VITALS
HEIGHT: 67 IN | WEIGHT: 158.29 LBS | RESPIRATION RATE: 14 BRPM | DIASTOLIC BLOOD PRESSURE: 75 MMHG | OXYGEN SATURATION: 95 % | TEMPERATURE: 99.3 F | SYSTOLIC BLOOD PRESSURE: 123 MMHG | BODY MASS INDEX: 24.84 KG/M2 | HEART RATE: 85 BPM

## 2020-07-10 PROCEDURE — 74011000250 HC RX REV CODE- 250: Performed by: NEUROLOGICAL SURGERY

## 2020-07-10 PROCEDURE — 97116 GAIT TRAINING THERAPY: CPT

## 2020-07-10 PROCEDURE — 74011250637 HC RX REV CODE- 250/637: Performed by: NEUROLOGICAL SURGERY

## 2020-07-10 PROCEDURE — 97161 PT EVAL LOW COMPLEX 20 MIN: CPT

## 2020-07-10 PROCEDURE — 74011250636 HC RX REV CODE- 250/636: Performed by: NEUROLOGICAL SURGERY

## 2020-07-10 PROCEDURE — 97535 SELF CARE MNGMENT TRAINING: CPT

## 2020-07-10 PROCEDURE — 99218 HC RM OBSERVATION: CPT

## 2020-07-10 PROCEDURE — 74011250637 HC RX REV CODE- 250/637: Performed by: NURSE PRACTITIONER

## 2020-07-10 PROCEDURE — 97165 OT EVAL LOW COMPLEX 30 MIN: CPT

## 2020-07-10 RX ORDER — HYDROMORPHONE HYDROCHLORIDE 2 MG/1
2 TABLET ORAL
Qty: 12 TAB | Refills: 0 | Status: SHIPPED | OUTPATIENT
Start: 2020-07-10 | End: 2020-07-13

## 2020-07-10 RX ORDER — HYDROMORPHONE HYDROCHLORIDE 2 MG/1
2 TABLET ORAL
Status: DISCONTINUED | OUTPATIENT
Start: 2020-07-10 | End: 2020-07-10 | Stop reason: HOSPADM

## 2020-07-10 RX ORDER — ACETAMINOPHEN 325 MG/1
650 TABLET ORAL
Qty: 10 TAB | Refills: 0 | Status: SHIPPED
Start: 2020-07-10

## 2020-07-10 RX ORDER — OXYCODONE HYDROCHLORIDE 5 MG/1
5 TABLET ORAL
Qty: 12 TAB | Refills: 0 | Status: SHIPPED | OUTPATIENT
Start: 2020-07-10 | End: 2020-07-10

## 2020-07-10 RX ADMIN — MORPHINE SULFATE 2 MG: 2 INJECTION, SOLUTION INTRAMUSCULAR; INTRAVENOUS at 03:39

## 2020-07-10 RX ADMIN — ACETAMINOPHEN 650 MG: 325 TABLET ORAL at 11:54

## 2020-07-10 RX ADMIN — Medication 10 ML: at 09:15

## 2020-07-10 RX ADMIN — ACETAMINOPHEN 650 MG: 325 TABLET ORAL at 00:09

## 2020-07-10 RX ADMIN — CEFAZOLIN SODIUM 2 G: 300 INJECTION, POWDER, LYOPHILIZED, FOR SOLUTION INTRAVENOUS at 00:09

## 2020-07-10 RX ADMIN — POLYETHYLENE GLYCOL 3350 17 G: 17 POWDER, FOR SOLUTION ORAL at 09:15

## 2020-07-10 RX ADMIN — CEFAZOLIN SODIUM 2 G: 300 INJECTION, POWDER, LYOPHILIZED, FOR SOLUTION INTRAVENOUS at 09:15

## 2020-07-10 RX ADMIN — DOCUSATE SODIUM 50MG AND SENNOSIDES 8.6MG 1 TABLET: 8.6; 5 TABLET, FILM COATED ORAL at 09:15

## 2020-07-10 RX ADMIN — MORPHINE SULFATE 2 MG: 2 INJECTION, SOLUTION INTRAMUSCULAR; INTRAVENOUS at 09:21

## 2020-07-10 RX ADMIN — HYDROMORPHONE HYDROCHLORIDE 2 MG: 2 TABLET ORAL at 14:40

## 2020-07-10 NOTE — PROGRESS NOTES
[facility] visit. Mrs. Walter Jaramillo was asleep. Unable to complete a Spiritual Assessment. She is [employer]alian. Prayer for her well-being and spiritual communion offered.     JOSIAH Gifford, RN, ACSW, LCSW   Page:  [phone]

## 2020-07-10 NOTE — PROGRESS NOTES
Patient discharged to home. Patient alert and oriented x 4. Vital signs remain stable and patient is afebrile. Discharge instructions and prescriptions discussed with patient. Patient verbalizes understanding. Time allotted for questions. Patient and belongings transported to [facility] via wheel chair. PIV removed. Discharge instructions given to patient.  Patient viewed Laminectomy discharge video

## 2020-07-10 NOTE — PROGRESS NOTES
OCCUPATIONAL THERAPY EVALUATION/DISCHARGE  Patient: Cheryl Robledo (22 y.o. female)  Date: 7/10/2020  Primary Diagnosis: Lumbar herniated disc [M51.26]  Procedure(s) (LRB):  LUMBAR LAMINECTOMY AND DISCECTOMY L5-S1 (Left) 1 Day Post-Op   Precautions: Spine       ASSESSMENT  Based on the objective data described below, the patient presents with post-op surgical pain and decreased functional reach to lower extremities following lumbar laminectomy and discectomy L5-S1. Patient received in bed - educated patient on log roll technique as well as general spinal precautions (including compensatory dressing, grooming, and toileting strategies to minimize discomfort/pain and protect spine). Patient receptive and with good carryover - min verbal cues needed to avoid twisting during functional tasks. Patient completing full toileting, grooming, and dressing routine with up to CGA, using tailor-sitting technique for lower body. Patient with no further acute OT needs and no OT needs at discharge. Is clear for return home from OT perspective. Current Level of Function (ADLs/self-care): up to CGA for safety with transfers and mobility during functional tasks    Functional Outcome Measure: The patient scored 70/100 on the Barthel Index. Other factors to consider for discharge:  working from home right now; lives with  and 4 children; working from home during [address] :  Recommendation for discharge: (in order for the patient to meet his/her long term goals)  No skilled occupational therapy/ follow up rehabilitation needs identified at this time. This discharge recommendation:  Has been made in collaboration with the attending provider and/or case management    IF patient discharges home will need the following DME: none       SUBJECTIVE:   Patient stated That wasn't so bad\" after using log roll technique to come to seated EOB.     OBJECTIVE DATA SUMMARY:   HISTORY:   Past Medical History:   Diagnosis Date    Abnormal Papanicolaou smear of cervix     follow up normal    Anemia     Breast disorder     augmentation 2004    Psychiatric problem     anxiety     Past Surgical History:   Procedure Laterality Date    BREAST SURGERY PROCEDURE UNLISTED      HX OTHER SURGICAL      wisdom teeth       Prior Level of Function/Environment/Context: Patient was independent and active with no DME - completed self-care independently and was very involved with IADL completion, sharing responsibilities with . Patient lives with  and their four children, between the ages of 3year old and 16years old. Expanded or extensive additional review of patient history:   Home Situation  Home Environment: Private residence  # Steps to Enter: 6  Rails to Enter: Yes  Hand Rails : Bilateral  One/Two Story Residence: (3 stories, MBR 2nd level)  Living Alone: No  Support Systems: Spouse/Significant Other/Partner, Child(aissatou)(4 children (1-18 yo),  working from home right now)  Patient Expects to be Discharged to[de-identified] Private residence  Current DME Used/Available at Home: None  Tub or Shower Type: Shower(built in seat)    Hand dominance: Right    EXAMINATION OF PERFORMANCE DEFICITS:  Cognitive/Behavioral Status:  Neurologic State: Alert; Appropriate for age  Orientation Level: Oriented X4  Cognition: Appropriate decision making; Appropriate safety awareness; Appropriate for age attention/concentration; Follows commands  Perception: Appears intact  Perseveration: No perseveration noted  Safety/Judgement: Insight into deficits;Home safety; Fall prevention;Good awareness of safety precautions; Awareness of environment    Skin: Observed incision - intact and dry. Hearing: Auditory  Auditory Impairment: None    Vision/Perceptual:    Acuity: Within Defined Limits       Range of Motion:  AROM: Within functional limits  PROM: Within functional limits    Strength:  Strength:  Within functional limits    Coordination:  Coordination: Within functional limits  Fine Motor Skills-Upper: Left Intact; Right Intact    Gross Motor Skills-Upper: Left Intact; Right Intact    Tone & Sensation:  Tone: Normal  Sensation: Impaired    Balance:  Sitting: Intact  Standing: Intact    Functional Mobility and Transfers for ADLs:  Bed Mobility:  Rolling: Contact guard assistance  Supine to Sit: Contact guard assistance  Scooting: Supervision    Transfers:  Sit to Stand: Stand-by assistance  Stand to Sit: Stand-by assistance  Bed to Chair: Contact guard assistance  Bathroom Mobility: Supervision/set up  Toilet Transfer : Supervision    ADL Assessment:  Feeding: Independent    Oral Facial Hygiene/Grooming: Setup;Supervision    Upper Body Dressing: Setup;Supervision    Lower Body Dressing: Setup;Supervision    Toileting: Modified independent    ADL Intervention and task modifications:  Feeding  Feeding Assistance: Independent  Container Management: Independent  Drink to Mouth: Independent    Grooming  Grooming Assistance: Set-up; Supervision  Position Performed: Standing  Washing Hands: Supervision  Brushing Teeth: Supervision  Cues: Verbal cues provided;Visual cues provided    Upper Body Dressing Assistance  Dressing Assistance: Set-up; Supervision  Bra: Set-up  Hospital Gown: Set-up  Pullover Shirt: Set-up  Cues: Verbal cues provided;Visual cues provided    Lower Body Dressing Assistance  Dressing Assistance: Set-up; Supervision  Underpants: Set-up  Leg Crossed Method Used: Yes  Position Performed: Seated in chair  Cues: Verbal cues provided;Visual cues provided;Don;Doff    Toileting  Toileting Assistance: Independent  Bladder Hygiene: Independent  Clothing Management: Independent  Cues: Verbal cues provided    Cognitive Retraining  Safety/Judgement: Insight into deficits;Home safety; Fall prevention;Good awareness of safety precautions; Awareness of environment    Patient instructed and demonstrated 3/3 spine precautions with min verbal cues.      Patient instructed and indicated understanding the benefits of maintaining activity tolerance, functional mobility, and independence with self care tasks during acute stay  to ensure safe return home and to baseline. Encouraged patient to increase frequency and duration OOB, not sitting longer than 30 mins without marching/walking with staff, be out of bed for all meals, perform daily ADLs (as approved by RN/MD regarding bathing etc), and performing functional mobility to/from bathroom. Patient instruction and indicated understanding on body mechanics, ergonomics and gravitational force on the spine during different body positions to plan activities in prep for return home to complete basic ADLs, instrumental ADLs and back to work safely. Bathing: Patient instructed and indicated understanding when bathing to not submerge wound in water, stand to shower or sponge bathe, cover wound with plastic and tape to ensure no water reaches bandage/wound without cues. Dressing lower body: Patient instructed on the benefits to remain seated to don all clothing to increase independence with precautions and pain management. Patient instructed and demonstrated tailor sitting for lower body dressing with supervision. Patient instructed to complete supine hip ER stretch and hold 10 seconds to increase ROM in prep for lower body ADLs daily. Toileting: Patient instructed on the benefits of using flushable wet wipes and toilet tongs if decreased reach or pain for heaven care. Also, the benefits of a reacher to aid in clothing management. Exertion: Patient instruction and indicated understanding to not strain i.e. holding breath to bear down during a bowel movement, lifting/activity, and sexual activity.    Home safety: Patient instructed and indicated understanding on home modifications and safety [raise height of ADL objects (i.e. clothing, sink items, fridge items, items to mouth when grooming), appropriate height of chair surfaces, recliner safety, change of floor surfaces, clear pathways] to increase independence and fall prevention. Standing: Patient instructed and indicated understanding to walk up to sink/counter top/surfaces, step into walker, square off while using objects, slide objects along surfaces, to increase adherence to back precautions and fall prevention. Patient instructed to increase amount of time standing in order to increase independence and tolerance with ADLs. During prolonged standing, can open cabinet door or place foot on stool to decrease spinal pressure/increase pain. Tub transfer: Patient instructed and indicated understanding regarding when it is safe to begin transfer into tub (complete stairs with PT, advance exercises with PT high enough to clear tub height, and while clothes donned practice with another person present). Functional Measure:  Barthel Index:    Bathin  Bladder: 10  Bowels: 10  Groomin  Dressin  Feeding: 10  Mobility: 10  Stairs: 0  Toilet Use: 10  Transfer (Bed to Chair and Back): 10  Total: 70/100        The Barthel ADL Index: Guidelines  1. The index should be used as a record of what a patient does, not as a record of what a patient could do. 2. The main aim is to establish degree of independence from any help, physical or verbal, however minor and for whatever reason. 3. The need for supervision renders the patient not independent. 4. A patient's performance should be established using the best available evidence. Asking the patient, friends/relatives and nurses are the usual sources, but direct observation and common sense are also important. However direct testing is not needed. 5. Usually the patient's performance over the preceding 24-48 hours is important, but occasionally longer periods will be relevant. 6. Middle categories imply that the patient supplies over 50 per cent of the effort. 7. Use of aids to be independent is allowed. Abena Ríos., Barthel, D.W. ([id number]). Functional evaluation: the Barthel Index. [facility] (14)2. HOWARD Miller, Татьяна Quinones., Ethel Hernandez., Karine, [address] (1999). Measuring the change indisability after inpatient rehabilitation; comparison of the responsiveness of the Barthel Index and Functional [place] Measure. Journal of Neurology, Neurosurgery, and Psychiatry, 66(4), 914-339. TI Casillas, FOUZIA Alcantara, & Adriana Ahumada M.A. (2004.) Assessment of post-stroke quality of life in cost-effectiveness studies: The usefulness of the Barthel Index and the EuroQoL-5D. Quality of Life Research, 15, 201-97     Occupational Therapy Evaluation Charge Determination   History Examination Decision-Making   LOW Complexity : Brief history review  LOW Complexity : 1-3 performance deficits relating to physical, cognitive , or psychosocial skils that result in activity limitations and / or participation restrictions  LOW Complexity : No comorbidities that affect functional and no verbal or physical assistance needed to complete eval tasks       Based on the above components, the patient evaluation is determined to be of the following complexity level: LOW   Pain Rating:  Patient reporting surgical pain of 7, \"not like before. \" Received medications prior to session. Activity Tolerance:   Good  Please refer to the flowsheet for vital signs taken during this treatment. After treatment patient left in no apparent distress:    Sitting in chair and Call bell within reach    COMMUNICATION/EDUCATION:   The patients plan of care was discussed with: Physical therapist and Registered nurse.      Thank you for this referral.  Christiano Irizarry OT  Time Calculation: 40 mins

## 2020-07-10 NOTE — DISCHARGE SUMMARY
Discharge Summary     Patient ID:  Jose Garza  [id number]   89 y.o.  1985    Admit date: 7/9/2020    Discharge Date: 7/10/2020      Admitting Physician: Shanon Odonnell MD     Discharge Physician: Mark Cedeno NP    Admission Diagnoses: Lumbar herniated disc [M51.26]    Last Procedure: Procedure(s):  LUMBAR LAMINECTOMY AND DISCECTOMY L5-S1    Discharge Diagnoses: Active Problems:    Lumbar herniated disc (7/9/2020)         Consults: None    Significant Diagnostic Studies:   1. CT lumbar spine without contrast on 07/09/2020 shows large central disc herniation L5-S1 causing significant spinal stenosis and blocking the left neural foramen. 2. MRI lumbar spine without contrast on 07/09/2020 shows L5-S1 left central disc extrusion likely affects the descending left S1 and possibly S2 nerves. No change since recent CT. Patient condition upon discharge: Stable    Hospital Course:   Since January 2020, the patient has been having low back pain radiating to her left leg and occasional radiation to the right leg. She has been followed by [address] [place] [address]. She thinks she had an MRI completed in the past, but does not know where it was completed. She denied bowel or bladder issues. She has completed physical therapy without improvement in her symptoms. She also has had 5 epidural steroid injections into the lumbar spine and the sacro-iliac region without relief. About a week ago, she developed severe pain radiating down her left leg and she came to the ER due to not being able to stand the pain anymore. She also was weak in her left leg. She was given narcotic analgesics without relief in her pain and also has tried several steroid courses without relief. Her CT of her lumbar spine revealed a large herniated disc at the left L5-S1 level causing significant spinal stenosis and blocking the left neural foramen.  After discussing the risks, benefits, and alternatives of a left L5-S1 laminectomy and discectomy, the patient consented to proceed. She underwent this procedure with Dr. Asif Hoffman on 07/09/2020. The intraoperative findings can be found in his operative report. Post-operatively, after a short stay in the recovery room, the patient transferred to the spine floor. She was afebrile and vital signs stable. Her pain was controlled. She was taking PO well, voiding on her own, and ambulating slowly. She worked with PT/OT and found to have no needs for home. She is ready for discharge to home today and will follow-up with Dr. Asif Hoffman in 2 weeks. Disposition: Home    Patient Instructions:   Current Discharge Medication List      START taking these medications    Details   acetaminophen (TYLENOL) 325 mg tablet Take 2 Tabs by mouth every six (6) hours as needed for Pain. Qty: 10 Tab, Refills: 0      Dilaudid 2 mg Take 1 tab by mouth every 4 hours as needed for pain  Qty: 12, Refills: 0    Associated Diagnoses: S/P discectomy for herniated nucleus pulposus         CONTINUE these medications which have NOT CHANGED    Details   norethindrone-e.estradioL-iron (Lo Loestrin Fe) 1 mg-10 mcg (24)/10 mcg (2) tab Take 1 Tab by mouth nightly. desvenlafaxine succinate (PRISTIQ) 50 mg ER tablet Take 50 mg by mouth nightly. DULoxetine (CYMBALTA) 20 mg capsule Take 40 mg by mouth nightly. Diet: Reference my discharge instructions. Activity: Reference my discharge instructions. EXAM:   A&Ox3. Speech clear. Affect normal  VAZQUEZ spontaneously. Strength 5/5 in all extremities including distal left lower extremity. Gait based and steady  Heart RRR  Lungs CTA BL  Abd soft, NT. Active bowel sounds  Ext no edema  Lumbar incision C/D/I with dermabond in place. No orders of the defined types were placed in this encounter. Total time discharging patient took greater than 30 minutes.     Signed:  Alonso Traore NP  July 10, 2020  [id number]

## 2020-07-10 NOTE — PROGRESS NOTES
Problem: Pain  Goal: *Control of Pain  Outcome: Progressing Towards Goal  Goal: *PALLIATIVE CARE:  Alleviation of Pain  Outcome: Progressing Towards Goal     Problem: Patient Education: Go to Patient Education Activity  Goal: Patient/Family Education  Outcome: Progressing Towards Goal     Problem: Falls - Risk of  Goal: *Absence of Falls  Description: Document Cristian Valdovinos Fall Risk and appropriate interventions in the flowsheet.   Outcome: Progressing Towards Goal  Note: Fall Risk Interventions:  Mobility Interventions: Patient to call before getting OOB         Medication Interventions: Evaluate medications/consider consulting pharmacy, Patient to call before getting OOB, Teach patient to arise slowly    Elimination Interventions: Call light in reach, Patient to call for help with toileting needs

## 2020-07-10 NOTE — PROGRESS NOTES
Transition of Care Plan:        RUR: N/A    No HH needs indicated. Patient will transport home today with  by car. Observation notice provided in writing to patient and/or caregiver as well as verbal explanation of the policy. Patients who are in outpatient status also receive the Observation notice. Observation notice provided in writing to patient and/or caregiver as well as verbal explanation of the policy. Patients who are in outpatient status also receive the Observation notice. Patient verbally consented to receiving the notice. Patient is alert and oriented; patient lives at home with  and family; Patient will transport home by car with  at discharge; patients PCP is Augustus Jurado and she uses the pharmacy [facility] [place], [place]; patient does not use any assistive devices at home    Reason for Admission:   Lumbar herniated disc                   RUR Score:   N/A                  Plan for utilizing home health:   No HH needs identified       PCP: First and Last name:  MIKAYLA Jurado      Are you a current patient: Yes/No:    Approximate date of last visit: January 2020   Can you participate in a virtual visit with your PCP: yes                    Current Advanced Directive/Advance Care Plan: does not have;  Full Code

## 2020-07-10 NOTE — PROGRESS NOTES
TRANSFER - IN REPORT:    Verbal report received from Cassie(name) on Polo Medina  being received from [facility]) for routine post - op      Report consisted of patients Situation, Background, Assessment and   Recommendations(SBAR). Information from the following report(s) SBAR, Kardex, Intake/Output and MAR was reviewed with the receiving nurse. Opportunity for questions and clarification was provided. Assessment completed upon patients arrival to unit and care assumed. Primary Nurse Alise Lanes, RN and Christiano Schwartz RN performed a dual skin assessment on this patient No impairment noted  Salvador score is 21    Incision to lower back with dermabond and open to air.

## 2020-07-10 NOTE — PROGRESS NOTES
PHYSICAL THERAPY EVALUATION/DISCHARGE  Patient: Barber Fang (3 y.o. female)  Date: 7/10/2020  Primary Diagnosis: Lumbar herniated disc [M51.26]  Procedure(s) (LRB):  LUMBAR LAMINECTOMY AND DISCECTOMY L5-S1 (Left) 1 Day Post-Op   Precautions: spine         ASSESSMENT  Based on the objective data described below, the patient presents with impaired trunk ROM, spinal precautions, L LE pain, balance, and weakness and gait dysfunction/intolerance causing limited independence with mobility s/p recent L5-S1 lami/discectomy POD #1. Pt received in chair, highly guarded requiring cues for relaxation, ambulates 300 ft and up/down steps with SBA. Pt tolerates well despite pain. Reviewed back precautions, sitting limit of 30-45 minutes, positioning in chair/couch/bed, and progress. Pt is cleared for discharge from Physical Therapy standpoint at this time, recommend HHPT. Will benefit from therapy in acute setting, anticipate will improve tolerance quickly with improved pain control. Functional Outcome Measure: The patient scored 90/100 on the barthel outcome measure which is indicative of low complxity. Other factors to consider for discharge:      Further skilled acute physical therapy is not indicated at this time. PLAN :  Recommendation for discharge: (in order for the patient to meet his/her long term goals)  No skilled physical therapy/ follow up rehabilitation needs identified at this time. This discharge recommendation:  Has been made in collaboration with the attending provider and/or case management    IF patient discharges home will need the following DME: none       SUBJECTIVE:   Patient stated It hurts but im okay.     OBJECTIVE DATA SUMMARY:   HISTORY:    Past Medical History:   Diagnosis Date    Abnormal Papanicolaou smear of cervix     follow up normal    Anemia     Breast disorder     augmentation 2004    Psychiatric problem     anxiety     Past Surgical History:   Procedure Laterality Date    BREAST SURGERY PROCEDURE UNLISTED      HX OTHER SURGICAL      wisdom teeth       Prior level of function: independent  Personal factors and/or comorbidities impacting plan of care:     Home Situation  Home Environment: Private residence  # Steps to Enter: 6  Rails to Enter: Yes  Hand Rails : Bilateral  One/Two Story Residence: (3 stories, MBR 2nd level)  Living Alone: No  Support Systems: Spouse/Significant Other/Partner, Child(aissatou)(4 children (1-16 yo),  working from home right now)  Patient Expects to be Discharged to[de-identified] Private residence  Current DME Used/Available at Home: None  Tub or Shower Type: Shower(built in seat)    EXAMINATION/PRESENTATION/DECISION MAKING:   Critical Behavior:  Neurologic State: Alert, Appropriate for age  Orientation Level: Oriented X4  Cognition: Appropriate decision making, Appropriate safety awareness, Appropriate for age attention/concentration, Follows commands  Safety/Judgement: Insight into deficits, Home safety, Fall prevention, Good awareness of safety precautions, Awareness of environment  Hearing: Auditory  Auditory Impairment: None  Skin:  intact  Edema: none  Range Of Motion:  AROM: Within functional limits           PROM: Within functional limits           Strength:    Strength:  Within functional limits                    Tone & Sensation:   Tone: Normal              Sensation: Impaired               Coordination:  Coordination: Grossly decreased, non-functional  Vision:   Acuity: Within Defined Limits  Functional Mobility:  Bed Mobility:  Rolling: Contact guard assistance  Supine to Sit: Contact guard assistance     Scooting: Supervision  Transfers:  Sit to Stand: Supervision  Stand to Sit: Supervision        Bed to Chair: Supervision              Balance:   Sitting: Intact  Standing: Intact  Ambulation/Gait Training:  Distance (ft): 300 Feet (ft)     Ambulation - Level of Assistance: Supervision     Gait Description (WDL): Exceptions to [facility]  Gait Abnormalities: Other(heavily guarded)        Base of Support: Widened     Speed/Danae: Slow  Step Length: Left shortened;Right shortened       Stairs:  Number of Stairs Trained: 12  Stairs - Level of Assistance: Supervision   Rail Use: Right        Functional Measure:  Barthel Index:    Bathin  Bladder: 10  Bowels: 10  Groomin  Dressin  Feeding: 10  Mobility: 15  Stairs: 10  Toilet Use: 10  Transfer (Bed to Chair and Back): 15  Total: 90/100       The Barthel ADL Index: Guidelines  1. The index should be used as a record of what a patient does, not as a record of what a patient could do. 2. The main aim is to establish degree of independence from any help, physical or verbal, however minor and for whatever reason. 3. The need for supervision renders the patient not independent. 4. A patient's performance should be established using the best available evidence. Asking the patient, friends/relatives and nurses are the usual sources, but direct observation and common sense are also important. However direct testing is not needed. 5. Usually the patient's performance over the preceding 24-48 hours is important, but occasionally longer periods will be relevant. 6. Middle categories imply that the patient supplies over 50 per cent of the effort. 7. Use of aids to be independent is allowed. Gayle Crimes., Barthel, D.W. (5617). Functional evaluation: the Barthel Index. [facility] (14)2. HOWARD Polo, Sergo Bassett., Rianna Davis., [place], [address] [place] [address] (). Measuring the change indisability after inpatient rehabilitation; comparison of the responsiveness of the Barthel Index and Functional Ford Measure. Journal of Neurology, Neurosurgery, and Psychiatry, 66(4), 826-458. Billie Landaverde, N.J.A, FOUZIA Alcantara, & MINGO CarterA. (2004.) Assessment of post-stroke quality of life in cost-effectiveness studies: The usefulness of the Barthel Index and the EuroQoL-5D.  Quality of Life Research, 15, 178-98            Physical Therapy Evaluation Charge Determination   History Examination Presentation Decision-Making   HIGH Complexity :3+ comorbidities / personal factors will impact the outcome/ POC  MEDIUM Complexity : 3 Standardized tests and measures addressing body structure, function, activity limitation and / or participation in recreation  LOW Complexity : Stable, uncomplicated  Other outcome measures barthel  LOW       Based on the above components, the patient evaluation is determined to be of the following complexity level: LOW     Pain Ratin/10, heavily guarded    Activity Tolerance: WNL  Please refer to the flowsheet for vital signs taken during this treatment. After treatment patient left in no apparent distress:   Sitting in chair and Call bell within reach    COMMUNICATION/EDUCATION:   The patients plan of care was discussed with: Registered nurse and Case management. Fall prevention education was provided and the patient/caregiver indicated understanding. and Patient/family have participated as able in goal setting and plan of care.     Thank you for this referral.  Laila Felton, PT   Time Calculation: 16 mins

## 2020-07-10 NOTE — PROGRESS NOTES
Excellent post op recovery  Pre op leg pain is resolved  good strength all muscle groups in LEs including plantar flexion  Able to void on own  Ambulatory    Ok to discharge and f/u in office

## 2020-07-10 NOTE — PERIOP NOTES
TRANSFER - OUT REPORT:    Verbal report given to Oanh(name) on Jose Aw  being transferred to (unit) for routine post - op       Report consisted of patients Situation, Background, Assessment and   Recommendations(SBAR). Time Pre op antibiotic given:1733  Anesthesia Stop time: 5024  Guido Present on Transfer to floor:no  Order for Guido on Chart:no  Discharge Prescriptions with Chart:no    Information from the following report(s) SBAR, OR Summary, MAR and Cardiac Rhythm nsr was reviewed with the receiving nurse. Opportunity for questions and clarification was provided. Is the patient on 02? YES       L/Min 2        Is the patient on a monitor? NO    Is the nurse transporting with the patient? YES    Surgical Waiting Area notified of patient's transfer from PACU? YES; spoke with  Alaina Olson, gave him room number and phone number    The following personal items collected during your admission accompanied patient upon transfer:   Dental Appliance: Dental Appliances: None  Vision: Visual Aid: None  Hearing Aid:    Jewelry: Jewelry: (locked up in patient room)  Clothing: Clothing: None(no belongings to preop)  Other Valuables:  Other Valuables: None  Valuables sent to safe:

## 2020-07-10 NOTE — OP NOTES
[address]  OPERATIVE REPORT    Name:  Rebecca Tomas  MR#:  [id number]  :  1985  ACCOUNT #:  [de-identified]  DATE OF SERVICE:  2020      PREOPERATIVE DIAGNOSIS:  Disk herniation L5-S1, left. POSTOPERATIVE DIAGNOSIS:  Disk herniation L5-S1, left. PROCEDURE PERFORMED:  Lumbar laminectomy and diskectomy L5-S1, left. SURGEON:  Cheri Mcmillan MD    ASSISTANT:  [facility].    ANESTHESIA:  General.    COMPLICATIONS:  None. SPECIMENS REMOVED:  none    IMPLANTS:  None. ESTIMATED BLOOD LOSS:  Minimal.    OPERATIVE PROCEDURE:  Review of imaging studies revealed a large free fragment disk herniation that had migrated cephalad and posteriorly in this patient who had symptoms that were not improving with conservative measures. She came to the emergency room urgently today because of intractable pain. DESCRIPTION OF PROCEDURE:  After discussing risks, benefits, and alternatives of surgery with her, she agreed to proceed with surgery and was taken to the operating room where she was induced under general endotracheal anesthesia and placed on the operating table in the prone position on chest rolls. The lumbar region scrubbed, prepped and draped in the usual sterile fashion. A time-out was performed to identify the correct patient and procedure and sequential stockings applied for DVT prophylaxis and appropriate antibiotics administered before making the incision. A small midline incision in the lumbosacral region was then made after a sterile scrub, prep and drape. The muscle was taken down in a subperiosteal fashion off of L5 and the sacrum. A self-retaining retractor was inserted into the edges of the incision. The sacrum was visually identified in order to identify the interspace. At the end of the procedure, a blunted instrument was placed into the L5-S1 disk space and an x-ray obtained to confirm position.   Under loupe magnification, a hemilaminectomy was performed at L5 using a combination of Kerrison and Leksell rongeurs. The ligamentum flavum was removed. The dura was clearly under a fair amount of pressure from the underlying disk fragment. A foraminotomy over the S1 root was performed and then the root and the dura were protected with a Love nerve root retractor by the assistant and the disk herniation came into view as there was a large extruded fragment out of the disk space. It had not ruptured through the annulus, so the annulus was incised with a #15 blade scalpel and disk material began to ooze out. Using a dull nerve hook, I was able to tease 2 large fragments out before utilizing the Peapod rongeur and a straight pituitary rongeur to remove further fragments. The disk space itself was then entered and multiple fragments of disk material were retrieved from within the disk space that were posteriorly herniated. The nerve root and thecal sac was seen to occupy a more normal anatomical shape and position. I passed a BaroFold dental instrument anterior to the canal as well as out over the nerve root and under the nerve root at S1 to ensure that they were indeed free. No other fragments of disk material were noted and the fragments were sent to pathology for routine pathology. The area was irrigated copiously with antibiotic irrigation. As noted an x-ray was obtained after placing a Penfield 4 in the interspace to confirm position. A piece of Gelfoam was laid over the surface of the dura and some FloSeal over that. The fascia was closed with 0 Vicryl, the subcutaneous tissue with 3-0 interrupted inverted Vicryl sutures, and 4-0 Monocryl was used to close the subcuticular layer and Dermabond applied to the skin edges. The needle, sponge, and instrument count were correct at the end of procedure. The patient tolerated the procedure well and there were no complications.         Shilo Eid MD      [id number]  D:  07/09/2020 19:00  T: 07/10/2020 1:00  JOB #:  [id number]  CC:  Krunal Gibson MD

## 2020-07-10 NOTE — DISCHARGE INSTRUCTIONS
Aretha Haynes M.D. What can I do?  The only exercise you should do is walking. Start by walking twice a day for 5 minutes, then increase by  2-3 minutes every day until you reach 25 minutes twice a day. DO NOT sit for long periods of time (20 minutes at a time for the first couple of weeks, then gradually increase.  No heavy lifting (5 lbs max); no straining, twisting, or bending.  No driving until your physician tells you it is ok. It is, however, ok to ride short distances in a car.  If you are required to wear a back brace, you may remove it when you are sleeping unless your doctor has advised against it.  If you are required to wear a cervical collar, you must sleep in it. You can remove it only for showers. What can I eat?  You may resume your regular home diet as tolerated. If your throat is sore, you may want to eat soft food for the first few days. When can I take a shower?  You may shower 48 hours after surgery, leaving on your dressing. Change the dressing to a large band-aid after showering. Change the bandage daily until your follow-up appt. Leave steri-strips on. These will fall off on their own.  Do not take baths or soak in pools.  You may remove your brace for showering, if you have been advised to wear a brace. Medications:   Check with your physician before taking any anti-inflammatory medicines (Advil, Aleve, ibuprofen, aspirin).  Take prescription medicine as directed. DO NOT take more than prescribed. Call your physician if the prescribed dose is not sufficiently controlling your pain.  It is important to have regular bowel movements. Pain medications may cause constipation. Drink plenty of fluids, get enough fiber in your diet, and use stool softeners and drink prune juice to help prevent constipation. Do not take laxatives if at all possible except in severe situations.   It can result in a vicious cycle of constipation and diarrhea.  Do not put any ointments or creams on your incision unless directed to do so by your physician.  Tobacco products should be avoided during the postoperative phase. When do I see the physician again?  Please call your physicians office as soon as you get home to schedule your 1st post operative appointment. You should be seen approximately two weeks after your surgery.  If you had a fusion, you will need to get an order for xrays to be taken prior to the six week appointment. These should be done on the day of the appointment or 1-2 days before. NOTIFY YOUR PHYSICIAN OF ANY OF THE FOLLOWING:     Fever above 101º for 24 hrs.  Nausea or vomiting.  Inability to urinate   Loss of bowel or bladder function (sudden onset of incontinence)   Changes in sensation (numbness, tingling, color change) in your extremities   Pain not relieved by your medicine.    Redness, swelling or drainage from your incision   Persistent pain in the calf of either leg   Development of severe pain      FOR APPOINTMENTS OR QUESTIONS CALL:    [facility] [address] [place] [address]  [phone]

## 2020-07-14 NOTE — H&P
[address]  HISTORY AND PHYSICAL    Name:  Elena Alvarez  MR#:  [id number]  :  1985  ACCOUNT #:  [de-identified]  DATE OF SERVICE:  2020    AMENDED REPORT:  Revised work type on 2020/bjs      CHIEF COMPLAINT:  A 40-year-old woman that I saw earlier this morning in the emergency room at the request of Dr. Fiorella Aguillon for surgical evaluation of a large disc herniation at L5-S1 on the left. HISTORY OF PRESENT ILLNESS:  The patient states that since January, seven months ago, she has had low back pain radiating to the left leg, occasionally the right leg. She has been followed by [facility]. She thinks she had an MRI done at that time, but she does not know what the results were. It may have been done at [address] [place] [address], not at one of the local hospitals. She denies any bowel and bladder deficits. She has had a full course of physical therapy with no relief. She just finished her fifth injection to the lumbar spine and/or sacral iliac region with no relief of her symptoms. Starting about a week ago, she started developing severe pain that radiated down the left leg, and today she came to the emergency room when she just could not stand the pain any longer. She feels a little weak in the leg on the left side as well. She was given several doses of narcotic analgesics with no relief of her symptoms. She has tried oral steroids in the form of Medrol Claude sometime over the last several months with no improvement as well. PAST MEDICAL HISTORY:  Unremarkable. She has anxiety disorder for which she takes Cymbalta, otherwise she takes no other medications. PAST SURGICAL HISTORY:  She had a breast augmentation in . ALLERGIES:  NO KNOWN DRUG ALLERGIES. SOCIAL HISTORY:  She does not smoke or use alcohol. She is an educator. PHYSICAL EXAMINATION:  GENERAL:  She is awake and alert, oriented to person, place, and time.   VITAL SIGNS:  Most recent blood pressure 119/83, pulse rate 94, respiratory rate 18. NEUROLOGIC:  She does appear to be in a fair amount of discomfort secondary to her back pain. Straight leg raising is limited to less than 20 degrees and reproduces severe left leg pain. She has weakness on plantar flexion at about 4/5 strength of the left foot and decreased sensation along the S1 distribution to light touch. She has an absent ankle jerk reflex bilaterally. The remainder of her neurologic exam is normal.  Cranial nerve function II-XII is normal.  Speech is fluent. Mood and affect are normal.     IMAGING STUDIES:  Include a CT scan of the lumbar spine that shows a large disc herniation at L5-S1. I ordered an MRI of lumbar region. The report reveals an L5-S1 left to central disc herniation confirming the findings on the CT scan. I had the opportunity to review the MRI and it shows a very large free fragment disc herniation at L5-S1. This is not going to improve in my opinion with further conservative measures. I recommended lumbar laminectomy discectomy at the L5-S1 interspace level on the left side. I described the risks and benefits, I think that she should be operated on today since she is at risk of a cauda equina syndrome given the large disc herniation. I have discussed this with her, she wishes to proceed. I have informed the Operating Room, and we will go ahead and get this done today.       MD VELASQUEZ [email or web address]  D:  07/09/2020 15:52  T:  07/09/2020 17:29  JOB #:  [id number]  CC:  Annika Hu MD

## 2022-03-18 PROBLEM — M51.26 LUMBAR HERNIATED DISC: Status: ACTIVE | Noted: 2020-07-09

## 2022-03-18 PROBLEM — Z37.9 NORMAL LABOR: Status: ACTIVE | Noted: 2019-06-14

## 2022-03-18 PROBLEM — F41.9 ANXIETY: Status: ACTIVE | Noted: 2019-06-14

## 2024-05-09 NOTE — ANESTHESIA POSTPROCEDURE EVALUATION
Procedure(s):  LUMBAR LAMINECTOMY AND DISCECTOMY L5-S1.    general    Anesthesia Post Evaluation      Multimodal analgesia: multimodal analgesia used between 6 hours prior to anesthesia start to PACU discharge  Patient location during evaluation: PACU  Patient participation: complete - patient participated  Level of consciousness: awake  Pain score: 2  Pain management: adequate  Airway patency: patent  Anesthetic complications: no  Cardiovascular status: acceptable  Respiratory status: acceptable  Hydration status: acceptable  Comments: I have evaluated the patient and meets criteria for discharge from PACU. Shantel Fernandez MD  Post anesthesia nausea and vomiting:  controlled      INITIAL Post-op Vital signs:   Vitals Value Taken Time   /61 7/9/2020  7:45 PM   Temp 36.7 °C (98 °F) 7/9/2020  6:54 PM   Pulse 111 7/9/2020  7:47 PM   Resp 22 7/9/2020  7:47 PM   SpO2 94 % 7/9/2020  7:47 PM   Vitals shown include unvalidated device data.
kenyetta LEROY
normal...

## 2024-12-13 ENCOUNTER — OFFICE VISIT (OUTPATIENT)
Age: 39
End: 2024-12-13

## 2024-12-13 VITALS
BODY MASS INDEX: 20.18 KG/M2 | HEART RATE: 79 BPM | TEMPERATURE: 98.5 F | OXYGEN SATURATION: 97 % | WEIGHT: 128.6 LBS | HEIGHT: 67 IN | DIASTOLIC BLOOD PRESSURE: 68 MMHG | SYSTOLIC BLOOD PRESSURE: 101 MMHG

## 2024-12-13 DIAGNOSIS — K04.7 DENTAL INFECTION: Primary | ICD-10-CM

## 2024-12-13 RX ORDER — NORETHINDRONE ACETATE AND ETHINYL ESTRADIOL, ETHINYL ESTRADIOL AND FERROUS FUMARATE 1MG-10(24)
1 KIT ORAL DAILY
COMMUNITY

## 2024-12-13 RX ORDER — DULOXETIN HYDROCHLORIDE 20 MG/1
40 CAPSULE, DELAYED RELEASE ORAL NIGHTLY
COMMUNITY

## 2024-12-13 RX ORDER — TRAZODONE HYDROCHLORIDE 50 MG/1
50 TABLET, FILM COATED ORAL
COMMUNITY
Start: 2024-12-08

## 2024-12-13 NOTE — PATIENT INSTRUCTIONS
Take augmentin as prescribed with food. Continue otc tylenol/ibuprofen for pain. May use otc orajel to site, along with ice pack. Follow up with dental specialist.

## 2024-12-13 NOTE — PROGRESS NOTES
swelling and redness noticed to gums on outer side of implant. Patient reports pain at site. No swelling to right cheek noted.  Cardiovascular:      Rate and Rhythm: Normal rate and regular rhythm.      Heart sounds: Normal heart sounds.   Pulmonary:      Effort: Pulmonary effort is normal.      Breath sounds: Normal breath sounds.   Abdominal:      General: Abdomen is flat. Bowel sounds are normal.      Palpations: Abdomen is soft.   Musculoskeletal:         General: Normal range of motion.      Cervical back: Normal range of motion.   Skin:     General: Skin is warm and dry.   Neurological:      General: No focal deficit present.      Mental Status: She is alert.   Psychiatric:         Mood and Affect: Mood normal.         Behavior: Behavior normal.         Thought Content: Thought content normal.         Judgment: Judgment normal.      Vitals:    12/13/24 1710   BP: 101/68   Site: Right Upper Arm   Position: Sitting   Cuff Size: Medium Adult   Pulse: 79   Temp: 98.5 °F (36.9 °C)   TempSrc: Temporal   SpO2: 97%   Weight: 58.3 kg (128 lb 9.6 oz)   Height: 1.702 m (5' 7\")        No Known Allergies    Current Outpatient Medications   Medication Sig Dispense Refill   • LO LOESTRIN FE 1 MG-10 MCG / 10 MCG tablet Take 1 tablet by mouth daily     • DULoxetine (CYMBALTA) 20 MG extended release capsule Take 2 capsules by mouth nightly     • traZODone (DESYREL) 50 MG tablet 1 tablet     • amoxicillin-clavulanate (AUGMENTIN) 875-125 MG per tablet Take 1 tablet by mouth 2 times daily for 10 days 20 tablet 0     No current facility-administered medications for this visit.        Past Medical History:   Diagnosis Date   • Abnormal Papanicolaou smear of cervix     follow up normal   • Anemia    • Breast disorder     augmentation 2004   • Psychiatric problem     anxiety        Past Surgical History:   Procedure Laterality Date   • BREAST SURGERY     • OTHER SURGICAL HISTORY      wisdom teeth        Social History:   Social

## 2025-03-30 ENCOUNTER — OFFICE VISIT (OUTPATIENT)
Age: 40
End: 2025-03-30

## 2025-03-30 VITALS
HEART RATE: 91 BPM | BODY MASS INDEX: 19.95 KG/M2 | OXYGEN SATURATION: 98 % | DIASTOLIC BLOOD PRESSURE: 71 MMHG | TEMPERATURE: 98.3 F | RESPIRATION RATE: 18 BRPM | SYSTOLIC BLOOD PRESSURE: 101 MMHG | WEIGHT: 127.4 LBS

## 2025-03-30 DIAGNOSIS — B37.0 ORAL THRUSH: Primary | ICD-10-CM

## 2025-03-30 RX ORDER — DESVENLAFAXINE 50 MG/1
50 TABLET, FILM COATED, EXTENDED RELEASE ORAL NIGHTLY
COMMUNITY

## 2025-03-30 RX ORDER — NYSTATIN 100000 [USP'U]/ML
SUSPENSION ORAL
COMMUNITY
Start: 2025-03-27

## 2025-03-30 RX ORDER — FLUCONAZOLE 100 MG/1
TABLET ORAL
Qty: 8 TABLET | Refills: 0 | Status: SHIPPED | OUTPATIENT
Start: 2025-03-30 | End: 2025-04-06

## 2025-03-30 NOTE — PROGRESS NOTES
3/30/2025   Purnima Young (: 1985) is a 39 y.o. female, established patient, here for evaluation of the following chief complaint(s):  Thrush (Pt states she has oral thrush on tongue x 2 weeks and is getting worse.)     ASSESSMENT/PLAN:  Below is the assessment and plan developed based on review of pertinent history, physical exam, labs, studies, and medications.  1. Oral thrush    Will treat for oral thrush today with fluconazole as patient reports that she has tried both nystatin and clotrimazole with no relief.  Reports that symptoms seem to have worsened. Advised patient to ensure good oral hygiene (brush twice daily). Patient advised to follow-up with PCP for further evaluation if symptoms do not resolve. Go to the ED for any acute or worsening symptoms.  Handout given with care instructions  2. OTC for symptom management. Increase fluid intake, ensure adequate nutritional intake.  3. Follow up with PCP as needed.  4. Go to ED with development of any acute symptoms.     Follow up:    Follow up immediately for any new, worsening or changes or if symptoms are not improving over the next 5-7 days.     SUBJECTIVE/OBJECTIVE:  39-year-old patient here today with complaint of ongoing oral thrush for at least 2 weeks.  Patient reports that she was seen by virtual visits twice reports that she was prescribed clotrimazole which seemed to make symptoms worse.  Reports that she was then prescribed nystatin suspension, which she has been using with no relief.  Patient reports that thrush initially started at the back of her tongue and is now spread to the front and seems to be getting worse.  Patient denies any fever or any other complaints.  Patient reports that she gets about 3-4 episodes of oral thrush a year.  Patient reports that typically nystatin has worked in the past, but does not seem to be working at this time        Thrush (Pt states she has oral thrush on tongue x 2 weeks and is getting

## (undated) DEVICE — NEEDLE HYPO 22GA L1.5IN BLK S STL HUB POLYPR SHLD REG BVL

## (undated) DEVICE — TUBING, SUCTION, 1/4" X 10', STRAIGHT: Brand: MEDLINE

## (undated) DEVICE — INTENDED FOR TISSUE SEPARATION, AND OTHER PROCEDURES THAT REQUIRE A SHARP SURGICAL BLADE TO PUNCTURE OR CUT.: Brand: BARD-PARKER ® CARBON RIB-BACK BLADES

## (undated) DEVICE — BONE WAX WHITE: Brand: BONE WAX WHITE

## (undated) DEVICE — GARMENT,MEDLINE,DVT,INT,CALF,MED, GEN2: Brand: MEDLINE

## (undated) DEVICE — STRAP,POSITIONING,KNEE/BODY,FOAM,4X60": Brand: MEDLINE

## (undated) DEVICE — DRAPE SURG W41XL74IN CLR FULL SZ C ARM 3 ADH POLY STRP E

## (undated) DEVICE — STERILE POLYISOPRENE POWDER-FREE SURGICAL GLOVES WITH EMOLLIENT COATING: Brand: PROTEXIS

## (undated) DEVICE — HANDLE LT SNAP ON ULT DURABLE LENS FOR TRUMPF ALC DISPOSABLE

## (undated) DEVICE — PREP SKN PREVAIL 40ML APPL --

## (undated) DEVICE — SOLUTION IV 1000ML 0.9% SOD CHL

## (undated) DEVICE — SUTURE VCRL SZ 0 L45CM ABSRB VLT OS-6 L36.4MM 1/2 CIR REV J711T

## (undated) DEVICE — FLOSEAL HEMOSTATIC MATRIX, 5ML: Brand: FLOSEAL HEMOSTATIC MATRIX

## (undated) DEVICE — SUTURE VCRL SZ 2-0 L18IN ABSRB UD L26MM CP-2 1/2 CIR REV J762D

## (undated) DEVICE — TOOL 14BA50 LEGEND 14CM 5MM BA: Brand: MIDAS REX ™

## (undated) DEVICE — LAMINECTOMY RICHMOND-LF: Brand: MEDLINE INDUSTRIES, INC.

## (undated) DEVICE — INFECTION CONTROL KIT SYS

## (undated) DEVICE — POSITIONER HD REST FOAM CMFRT TCH

## (undated) DEVICE — TOTAL TRAY, DB, 100% SILI FOLEY, 16FR 10: Brand: MEDLINE

## (undated) DEVICE — FLOSEAL HEMOSTATIC MATRIX, 5 ML: Brand: FLOSEAL

## (undated) DEVICE — BIPOLAR FORCEPS CORD: Brand: VALLEYLAB